# Patient Record
Sex: FEMALE | Race: WHITE | NOT HISPANIC OR LATINO | Employment: OTHER | URBAN - METROPOLITAN AREA
[De-identification: names, ages, dates, MRNs, and addresses within clinical notes are randomized per-mention and may not be internally consistent; named-entity substitution may affect disease eponyms.]

---

## 2017-01-19 ENCOUNTER — COMMUNICATION - HEALTHEAST (OUTPATIENT)
Dept: FAMILY MEDICINE | Facility: CLINIC | Age: 72
End: 2017-01-19

## 2017-01-19 DIAGNOSIS — R60.9 EDEMA: ICD-10-CM

## 2019-04-29 ENCOUNTER — OFFICE VISIT - HEALTHEAST (OUTPATIENT)
Dept: FAMILY MEDICINE | Facility: CLINIC | Age: 74
End: 2019-04-29

## 2019-04-29 DIAGNOSIS — Z74.8 ASSISTANCE NEEDED WITH TRANSPORTATION: ICD-10-CM

## 2019-04-29 DIAGNOSIS — Z12.11 COLON CANCER SCREENING: ICD-10-CM

## 2019-04-29 DIAGNOSIS — R60.0 BILATERAL LOWER EXTREMITY EDEMA: ICD-10-CM

## 2019-04-29 DIAGNOSIS — Z13.820 SCREENING FOR OSTEOPOROSIS: ICD-10-CM

## 2019-04-29 DIAGNOSIS — Z00.00 ROUTINE GENERAL MEDICAL EXAMINATION AT A HEALTH CARE FACILITY: ICD-10-CM

## 2019-04-29 DIAGNOSIS — E66.3 OVERWEIGHT (BMI 25.0-29.9): ICD-10-CM

## 2019-04-29 DIAGNOSIS — Z72.0 TOBACCO USE: ICD-10-CM

## 2019-04-29 DIAGNOSIS — L30.9 DERMATITIS: ICD-10-CM

## 2019-04-29 DIAGNOSIS — D48.5 NEOPLASM OF UNCERTAIN BEHAVIOR OF SKIN: ICD-10-CM

## 2019-04-29 DIAGNOSIS — R79.89 OTHER SPECIFIED ABNORMAL FINDINGS OF BLOOD CHEMISTRY: ICD-10-CM

## 2019-04-29 DIAGNOSIS — N95.9 MENOPAUSAL AND PERIMENOPAUSAL DISORDER: ICD-10-CM

## 2019-04-29 DIAGNOSIS — Z12.31 VISIT FOR SCREENING MAMMOGRAM: ICD-10-CM

## 2019-04-29 RX ORDER — TRIAMCINOLONE ACETONIDE 1 MG/G
CREAM TOPICAL
Qty: 45 G | Refills: 1 | Status: SHIPPED | OUTPATIENT
Start: 2019-04-29 | End: 2022-03-07

## 2019-04-29 ASSESSMENT — MIFFLIN-ST. JEOR: SCORE: 1053.98

## 2019-04-30 ENCOUNTER — COMMUNICATION - HEALTHEAST (OUTPATIENT)
Dept: NURSING | Facility: CLINIC | Age: 74
End: 2019-04-30

## 2019-10-17 ENCOUNTER — AMBULATORY - HEALTHEAST (OUTPATIENT)
Dept: NURSING | Facility: CLINIC | Age: 74
End: 2019-10-17

## 2019-10-17 DIAGNOSIS — Z23 FLU VACCINE NEED: ICD-10-CM

## 2019-12-02 ENCOUNTER — COMMUNICATION - HEALTHEAST (OUTPATIENT)
Dept: FAMILY MEDICINE | Facility: CLINIC | Age: 74
End: 2019-12-02

## 2019-12-02 DIAGNOSIS — E55.9 VITAMIN D DEFICIENCY, UNSPECIFIED: ICD-10-CM

## 2019-12-02 DIAGNOSIS — R41.3 MEMORY DIFFICULTY: ICD-10-CM

## 2020-03-26 ENCOUNTER — COMMUNICATION - HEALTHEAST (OUTPATIENT)
Dept: FAMILY MEDICINE | Facility: CLINIC | Age: 75
End: 2020-03-26

## 2020-03-27 ENCOUNTER — COMMUNICATION - HEALTHEAST (OUTPATIENT)
Dept: FAMILY MEDICINE | Facility: CLINIC | Age: 75
End: 2020-03-27

## 2021-03-03 ENCOUNTER — AMBULATORY - HEALTHEAST (OUTPATIENT)
Dept: NURSING | Facility: CLINIC | Age: 76
End: 2021-03-03

## 2021-03-24 ENCOUNTER — AMBULATORY - HEALTHEAST (OUTPATIENT)
Dept: NURSING | Facility: CLINIC | Age: 76
End: 2021-03-24

## 2021-05-26 ENCOUNTER — OFFICE VISIT - HEALTHEAST (OUTPATIENT)
Dept: FAMILY MEDICINE | Facility: CLINIC | Age: 76
End: 2021-05-26

## 2021-05-26 DIAGNOSIS — Z00.00 WELLNESS EXAMINATION: ICD-10-CM

## 2021-05-26 DIAGNOSIS — Z12.31 VISIT FOR SCREENING MAMMOGRAM: ICD-10-CM

## 2021-05-26 LAB
ANION GAP SERPL CALCULATED.3IONS-SCNC: 10 MMOL/L (ref 5–18)
BUN SERPL-MCNC: 15 MG/DL (ref 8–28)
CALCIUM SERPL-MCNC: 10.1 MG/DL (ref 8.5–10.5)
CHLORIDE BLD-SCNC: 104 MMOL/L (ref 98–107)
CO2 SERPL-SCNC: 25 MMOL/L (ref 22–31)
CREAT SERPL-MCNC: 0.96 MG/DL (ref 0.6–1.1)
ERYTHROCYTE [DISTWIDTH] IN BLOOD BY AUTOMATED COUNT: 12.5 % (ref 11–14.5)
GFR SERPL CREATININE-BSD FRML MDRD: 57 ML/MIN/1.73M2
GLUCOSE BLD-MCNC: 123 MG/DL (ref 70–125)
HCT VFR BLD AUTO: 45 % (ref 35–47)
HGB BLD-MCNC: 15.5 G/DL (ref 12–16)
MCH RBC QN AUTO: 33 PG (ref 27–34)
MCHC RBC AUTO-ENTMCNC: 34.4 G/DL (ref 32–36)
MCV RBC AUTO: 96 FL (ref 80–100)
PLATELET # BLD AUTO: 300 THOU/UL (ref 140–440)
PMV BLD AUTO: 10.6 FL (ref 7–10)
POTASSIUM BLD-SCNC: 4.1 MMOL/L (ref 3.5–5)
RBC # BLD AUTO: 4.69 MILL/UL (ref 3.8–5.4)
SODIUM SERPL-SCNC: 139 MMOL/L (ref 136–145)
WBC: 7.9 THOU/UL (ref 4–11)

## 2021-05-26 ASSESSMENT — MIFFLIN-ST. JEOR: SCORE: 1074.68

## 2021-05-28 NOTE — PROGRESS NOTES
The Clinic Care Guide called the patient  today at the request of the PCP to discuss possible clinic care coordination enrollment. Clinic care coordination was described to the patient and immediate needs were discussed. The patient declined enrollment in clinic care coordination at this time. The patient was provided with contact information for the clinic care guide if their needs changed.    Priority: Routine Class: Internal Referral    Standing Status: Normal Status: Sent [2]    Ordering User: Annelise Cornejo MD Department: [facility]/ob    Auth Provider: ANNELISE CORNEJO Enc Provider: Annelise Cornejo MD    Diagnosis: Assistance needed with transportation      Indications of Use:         Expected Date:        Order Questions:          Order Comments: Transportation difficulties      Sched Instructions:   Patient Instructions:       Visit Types: CCC RN ASSESSMENT      Sched Instruct (Non-Radiology):         Referral Priority: Routine [1]      Additional Comments:        Order Summary Internal Referral, Routine, Primary Care, Continuity of Care      Transfer History

## 2021-05-28 NOTE — PROGRESS NOTES
Assessment and Plan:       Nereyda was seen today for annual wellness visit.    Routine general medical examination at a health care facility  -     Lipid [place] FASTING; Future  -     Mumps Antibody, IgG; Future  -     Rubeola Antibody, IgG; Future  -     Rubella Antibody, IgG; Future  -     Hemoglobin; Future  -Future lab orders placed for fasting lipids and glucose.  She does express concern about recent news reports regarding cases of measles.  We discussed doing titers for measles mumps and rubella to see if a booster is needed.  These will be added to future labs.  Recommend mammogram, colonoscopy and bone density scan.  Orders are placed.  Prevnar 13 is administered today.  She is counseled on vaccine and side effects.  Advised smoking cessation.  Discussed new shingles vaccine.  Advised her to check with insurance.  Encouraged increased physical activity.  Encouraged healthy diet and adequate calcium and vitamin D intake.  Recommend follow-up in 1 year.  Discussed advanced directives. a copy of living will was provided    Dermatitis  -     triamcinolone (KENALOG) 0.1 % cream; Apply sparingly to affected skin twice daily for up to 14 days    Neoplasm of uncertain behavior of skin  -     Ambulatory referral to Dermatology    Bilateral lower extremity edema  -     US Venous Insufficiency Legs Bilateral; Future  -Recommend use of compression stockings and elevation of legs as much as possible    Visit for screening mammogram  -     Mammo Screening Bilateral; Future    Colon cancer screening  -     Ambulatory referral for Colonoscopy    Screening for osteoporosis  -     DXA Bone Density Scan; Future    Menopausal and perimenopausal disorder   -     DXA Bone Density Scan; Future    Overweight (BMI 25.0-29.9)  -     Comprehensive Metabolic Panel; Future  -     Glycosylated Hemoglobin A1c; Future    Assistance needed with transportation  -     Ambulatory referral to Care Management (Primary Care)    Other  specified abnormal findings of blood chemistry   -     Glycosylated Hemoglobin A1c; Future    Tobacco use  Advised cessation.  She declines assistance at this time          The patient's current medical problems were reviewed.    I have had an Advance Directives discussion with the patient.  The following high BMI interventions were performed this visit: encouragement to exercise  I have counseled the patient for tobacco cessation and the follow up will occur  at the next visit.  The following health maintenance schedule was reviewed with the patient and provided in printed form in the after visit summary:   Health Maintenance   Topic Date Due     COLONOSCOPY  01/27/1997     ZOSTER VACCINES (1 of 2) 01/27/1997     DXA SCAN  01/27/2012     MAMMOGRAM  05/03/2018     INFLUENZA VACCINE RULE BASED (1) 08/01/2018     FALL RISK ASSESSMENT  04/29/2020     TD 18+ HE  02/13/2022     ADVANCE DIRECTIVES DISCUSSED WITH PATIENT  04/29/2024     PNEUMOCOCCAL POLYSACCHARIDE VACCINE AGE 65 AND OVER  Completed     PNEUMOCOCCAL CONJUGATE VACCINE FOR ADULTS (PCV13 OR PREVNAR)  Completed        Subjective:   Chief Complaint: Nereyda Vincent is an 74 y.o. female here for an Annual Wellness visit.   HPI: Who comes in today accompanied by her daughter for annual wellness visit.  She typically does not come to the doctor on a regular basis.  She currently lives with her children.  States that she will travel around and spend a few weeks with one child in the move on to the next child for a few weeks and so on.  She is overall healthy individual.  She is a smoker.  No chronic medical problems.  No previous surgeries.  No regular medications.  Family history notable for mother with history of breast and colon cancer.  Patient reports she did not like to come to a lot of appointments because she does not have transportation.  She is overdue for a number of preventative cares including colonoscopy and mammogram.  She is not fasting today for  labs.  She is due for Prevnar 13.  She does agree to that today.  She has had Pneumovax.  On review of systems she reports concern about a scaly patch of skin on her left knee.  This is been present for several years but does not seem to have changed but does not itch or bother her.  She is also noticed some discolorations on her right leg.  These of also been present for a long time and have not changed.  They do not cause irritation.  She does get some swelling in her legs.  It is better first thing in the morning and when she puts her legs up.  She does not like wearing compression stockings as they are uncomfortable.  She has a small lump on her right lower leg that has been evaluated by CT scan and ultrasound in the past.  It was not a blood clot.  Results were reviewed.  It shows a fibrous tissue.  It has not changed.  She does get some discomfort in her legs at times.  She denies concerns with headaches, dizziness or lightheadedness.  No chest pain or pressure.  No dyspnea or chronic cough.  No breast concerns.  No GI concerns.  Pelvic pain, vaginal discharge or bleeding.  No urinary concerns.  Review of systems assessed and otherwise negative.  No other questions today    Review of Systems:  Please see above.  The rest of the review of systems are negative for all systems.    Patient Care Team:  Gissel Hollis DO as PCP - General (Family Medicine)     Patient Active Problem List   Diagnosis     Basal Cell Carcinoma Of The Skin     Dermatitis     Edema     Skin mass     Past Medical History:   Diagnosis Date     Elevated blood pressure 4/26/2016      History reviewed. No pertinent surgical history.   Family History   Problem Relation Age of Onset     Breast cancer Mother 60     Colon cancer Mother      Dementia Mother      Diabetes Father       Social History     Socioeconomic History     Marital status:      Spouse name: Not on file     Number of children: Not on file     Years of education: Not  "on file     Highest education level: Not on file   Occupational History     Not on file   Social Needs     Financial resource strain: Not on file     Food insecurity:     Worry: Not on file     Inability: Not on file     Transportation needs:     Medical: Not on file     Non-medical: Not on file   Tobacco Use     Smoking status: Current Every Day Smoker     Packs/day: 0.50     Smokeless tobacco: Never Used   Substance and Sexual Activity     Alcohol use: No     Drug use: Never     Sexual activity: Never   Lifestyle     Physical activity:     Days per week: Not on file     Minutes per session: Not on file     Stress: Not on file   Relationships     Social connections:     Talks on phone: Not on file     Gets together: Not on file     Attends Yarsanism service: Not on file     Active member of club or organization: Not on file     Attends meetings of clubs or organizations: Not on file     Relationship status: Not on file     Intimate partner violence:     Fear of current or ex partner: Not on file     Emotionally abused: Not on file     Physically abused: Not on file     Forced sexual activity: Not on file   Other Topics Concern     Not on file   Social History Narrative     Not on file      Current Outpatient Medications   Medication Sig Dispense Refill     triamcinolone (KENALOG) 0.1 % cream Apply sparingly to affected skin twice daily for up to 14 days 45 g 1     No current facility-administered medications for this visit.       Objective:   Vital Signs:   Visit Vitals  /80 (Patient Site: Right Arm, Patient Position: Sitting, Cuff Size: Adult Regular)   Pulse 61   Temp 97.7  F (36.5  C) (Oral)   Ht 4' 11.25\" (1.505 m)   Wt 142 lb 1 oz (64.4 kg)   SpO2 97%   BMI 28.45 kg/m         VisionScreening:  No exam data present     PHYSICAL EXAM  Physical Examination: General appearance - alert, well appearing, and in no distress  Mental status - alert, oriented to person, place, and time  Eyes - pupils equal and " reactive, extraocular eye movements intact  Ears - bilateral TM's and external ear canals normal  Nose - normal and patent, no erythema, discharge or polyps  Mouth - mucous membranes moist, pharynx normal without lesions  Neck - supple, no significant adenopathy  Lymphatics - no palpable lymphadenopathy, no hepatosplenomegaly  Chest - clear to auscultation, no wheezes, rales or rhonchi, symmetric air entry  Heart - normal rate, regular rhythm, normal S1, S2, no murmurs, rubs, clicks or gallops  Abdomen - soft, nontender, nondistended, no masses or organomegaly  Breasts - breasts appear normal, no suspicious masses, no skin or nipple changes or axillary nodes  Neurological - alert, oriented, normal speech, no focal findings or movement disorder noted  Extremities - peripheral pulses normal, no pedal edema, no clubbing or cyanosis  Skin - patch of dry scaly skin present anterior left knee, patches of hyperpigmentation present on left medial thigh      Assessment Results 4/29/2019   Activities of Daily Living No help needed   Instrumental Activities of Daily Living No help needed   Mini Cog Total Score 5   Some recent data might be hidden     A Mini-Cog score of 0-2 suggests the possibility of dementia, score of 3-5 suggests no dementia    Identified Health Risks:     She is at risk for lack of exercise and has been provided with information to increase physical activity for the benefit of her well-being.  The patient reports that she does not have all recommended working emergency equipment available. She was provided with information about emergency preparedness, including smoke detectors.  Information regarding advance directives (living wells), including where she can download the appropriate form, was provided to the patient via the AVS.

## 2021-06-01 ENCOUNTER — COMMUNICATION - HEALTHEAST (OUTPATIENT)
Dept: FAMILY MEDICINE | Facility: CLINIC | Age: 76
End: 2021-06-01

## 2021-06-03 VITALS — HEIGHT: 59 IN | WEIGHT: 142.06 LBS | BODY MASS INDEX: 28.64 KG/M2

## 2021-06-07 NOTE — TELEPHONE ENCOUNTER
Talked to patient about having to reschedule her pre-op somewhere else as we are no longer seeing patients face-to-face and we cannot do a pre-op via phone visit.   Patient stated that she is going to talk to her daughter and probably cancel her operation until later.    Jennifer Chamberlain, CMA

## 2021-06-17 NOTE — PATIENT INSTRUCTIONS - HE
Patient Instructions by Annelise Canales MD at 4/29/2019  4:00 PM     Author: Annelise Canales MD Service: -- Author Type: Physician    Filed: 4/29/2019  4:13 PM Encounter Date: 4/29/2019 Status: Signed    : Annelise Canales MD (Physician)         Patient Education     Exercise for a Healthier Heart  You may wonder how you can improve the health of your heart. If youre thinking about exercise, youre on the right track. You dont need to become an athlete, but you do need a certain amount of brisk exercise to help strengthen your heart. If you have been diagnosed with a heart condition, your doctor may recommend exercise to help stabilize your condition. To help make exercise a habit, choose safe, fun activities.       Be sure to check with your health care provider before starting an exercise program.    Why exercise?  Exercising regularly offers many healthy rewards. It can help you do all of the following:    Improve your blood cholesterol levels to help prevent further heart trouble    Lower your blood pressure to help prevent a stroke or heart attack    Control diabetes, or reduce your risk of getting this disease    Improve your heart and lung function    Reach and maintain a healthy weight    Make your muscles stronger and more limber so you can stay active    Prevent falls and fractures by slowing the loss of bone mass (osteoporosis)    Manage stress better  Exercise tips  Ease into your routine. Set small goals. Then build on them.  Exercise on most days. Aim for a total of 150 or more minutes of moderate to  vigorous intensity activity each week. Consider 40 minutes, 3 to 4 times a week. For best results, activity should last for 40 minutes on average. It is OK to work up to the 40 minute period over time. Examples of moderate-intensity activity is walking one mile in 15 minutes or 30 to 45 minutes of yard work.  Step up your daily activity level. Along with your exercise program, try being more active  throughout the day. Walk instead of drive. Do more household tasks or yard work.  Choose one or more activities you enjoy. Walking is one of the easiest things you can do. You can also try swimming, riding a bike, or taking an exercise class.  Stop exercising and call your doctor if you:    Have chest pain or feel dizzy or lightheaded    Feel burning, tightness, pressure, or heaviness in your chest, neck, shoulders, back, or arms    Have unusual shortness of breath    Have increased joint or muscle pain    Have palpitations or an irregular heartbeat      8374-0094 [employer]. [address] [place] [address]. All rights reserved. This information is not intended as a substitute for professional medical care. Always follow your healthcare professional's instructions.         Patient Education    Home Fire Safety  Each year, thousands of people, including children, are injured and killed in home fires. Children are often curious about fire, and may not understand the dangers. This makes home fire safety practices especially important. Three important things you can do to keep your home safe from fire are:    Install smoke alarms in your home and make sure they work properly.    Teach children not to play with matches, lighters, and other materials that can be used to start fires. And keep these materials out of childrens reach.    Teach children what to do in case of fire. Create a fire safety action plan and practice it.  Read on for more details about keeping your family and home safe from fire.        Being Prepared for a Fire  A home fire can happen at any time. The following can help you be prepared:    Install smoke alarms on every level of your home, including the basement and outside all sleeping areas. This simple step cuts your familys risk of dying in a fire nearly in half.    Test smoke alarms monthly, and change the batteries once a year or when the alarm chirps.    Dont disable  smoke alarms, even for a short time.    Ask your local fire department for tips on where to place smoke alarms in your home.    Replace all smoke alarms every 10 years.    Consider using voice smoke alarms. These alarms allow you to substitute your own voice for the alarm sound. They are helpful because many children dont wake up to the sound of a regular smoke alarm.    Install carbon monoxide detectors near sleeping areas.    Be aware that carbon monoxide is a byproduct of smoke that can be deadly. Its a gas that you cant see, smell, or taste.    Consider buying a combination smoke alarm / carbon monoxide detector.    Keep fire extinguishers in the home.    Keep them in accessible locations, especially in the kitchen.    Check usage dates to make sure they are not .    Use fire extinguishers only when the fire is in a contained area and is not spreading. (Otherwise, you should focus on getting out of the home.)    Train adults to use fire extinguishers. (Children should focus on getting out of the home during a fire.)    If you live in an apartment, talk to your landlord about where smoke alarms are and how often they are tested. Also ask about fire extinguisher locations and emergency exit routes.  Indoor Fire Safety  Many things in your home are potential fire hazards. Follow these steps to help keep your home safe.    Be careful in the kitchen.    Never leave food thats cooking unattended.    If a fire breaks out in a cooking pan, put a lid on it to smother it. And never throw water on a grease fire. It will make the fire worse.    Conduct a home safety inspection. Look for anything, such as frayed wires and cords, that can cause a fire. Fix or remove any fire safety hazards you find.    Keep all matches and lighters in a secured drawer or cabinet out of the reach of children. Use childproof lighters.    Check to make sure all appliances, including the stove, are turned off before leaving the  home.    Know where the gas main shut-off is located.    Make sure space heaters are stable and have protective covers. Keep them at least 3 feet from anything that can burn, such as curtains. Dont use space heaters in areas where young kids spend time alone.    Keep flammable liquids such as kerosene and gasoline locked up and safely stored away from kids and heat.    Keep all smoking materials out of reach of children. And never smoke in bed. If possible, smoke outdoors only.  Outdoor Fire Safety  Fire can be a hazard outdoors as well as indoors. When outdoors, be sure to do the following:    Always supervise kids near a barbecue grill, campfire, or portable stove.    Dont use fire pits around children. Kids can fall into them, and pits can be hot even after the fire goes out.    Keep a garden hose or fire extinguisher handy when cooking outdoors in case of fire.  Teaching Your Child About Fire Safety  One of the best ways to keep your home safe from fire is education. Make sure everyone in your family knows fire safety rules, including children.    Teach your children the dangers of matches, lighters, and other dangerous items.    Teach them to never touch these and other objects that are hot, such as candles.    Have them tell you right away whenever they find matches or lighters. Explain that these items are tools for grown-ups, not toys. And never amuse children with matches or lighters.    Round up all matches and lighters and store them safely. In case you missed some, ask your children to tell you where any are located throughout your home.    Never leave a child alone in a room with a lit candle. Dont allow teens to have candles in their rooms.    Show children what to do in case of fire.    Be sure your kids know what the fire alarm sounds like and what to do if it goes off.    Teach kids what to do if their clothes catch fire: Stop, Drop to the ground, and Roll until the fire is put out. They should also  cover their face with their hands. Practice these steps with your children. Make sure they understand that running will make the fire burn faster.    Show children how to crawl below smoke during a fire.    Make sure kids know at least two escape routes from each room in the home. These escape routes can be windows.    Teach kids to test doors for nearby fire by feeling for heat with the back of their hand. If the door is warm or hot, they should try their second exit.    Explain to children that they cant hide from a fire. Hiding in a closet or under a bed wont make them safe. Instead, they should try to escape the home. And if they cant escape, they should let others know they are trapped. They can do this by shutting the door to the room, opening a window, and turning on the lights.    Talk to your local fire department.    Introduce your children to a . Let them know that firefighters will look different when in full protective gear. Tell them to never hide from firefighters, and to follow all directions from firefighters during a fire.    Find out if the fire department has a fire safety program for kids.      Create a Fire Safety Plan  Create a plan for your family to follow in case of a fire. Try making it a family project. Important steps for the plan include leaving the home right away and having a designated meeting place.    Make sure your child understands to get out and stay out. He or she should get out of the home immediately and not go back in, even if family members or pets are still inside.    Decide on a safe meeting place away from the home for everyone to gather.    Teach children to call 911 or emergency services from a cell phone or neighbors phone. Make sure they know to do this only after they are safely out of the home.    Teach your children the fire safety plan. Practice it and make sure they understand it.    Have fire drills twice a year to keep your children prepared in case  of fire.    Visit the National Fire Protection Association web site at www.nfpa.org for more information.      0255-1451 [employer]. [address] [place] [address], [place] [address]. All rights reserved. This information is not intended as a substitute for professional medical care. Always follow your healthcare professional's instructions.         Patient Education   Understanding Advance Care Planning  Advance care planning is the process of deciding ones own future medical care. It helps ensure that if you cant speak for yourself, your wishes can still be carried out. The plan is a series of legal documents that note a persons wishes. The documents vary by state. Advance care planning may be done when a person has a serious illness that is expected to get worse. It may be done before major surgery. And it can help you and your family be prepared in case of a major illness or injury. Advance care planning helps with making decisions at these times.       A health care proxy is a person who acts as the voice of a patient when the patient cant speak for himself or herself. The name of this role varies by state. It may be called a Durable Medical Power of  or Durable Power of  for Healthcare. It may be called an agent, surrogate, or advocate. Or it may be called a representative or decision maker. It is an official duty that is identified by a legal document. The document also varies by state.    Why Is Advance Care Planning Important?  If a person communicates their healthcare wishes:    They will be given medical care that matches their values and goals.    Their family members will not be forced to make decisions in a crisis with no guidance.  Creating a Plan  Making an advance care plan is often done in 3 steps:    Thinking about ones wishes. To create an advance care plan, you should think about what kind of medical treatment you would want if you lose the ability to communicate. Are  there any situations in which you would refuse or stop treatment? Are there therapies you would want or not want? And whom do you want to make decisions for you? There are many places to learn more about how to plan for your care. Ask your doctor or  for resources.    Picking a health care proxy. This means choosing a trusted person to speak for you only when you cant speak for yourself. When you cannot make medical decisions, your proxy makes sure the instructions in your advance care plan are followed. A proxy does not make decisions based on his or her own opinions. They must put aside those opinions and values if needed, and carry out your wishes.    Filling out the legal documents. There are several kinds of legal documents for advance care planning. Each one tells health care providers your wishes. The documents may vary by state. They must be signed and may need to be witnessed or notarized. You can cancel or change them whenever you wish. Depending on your state, the documents may include a Healthcare Proxy form, Living Will, Durable Medical Power of , Advance Directive, or others.  The Familys Role  The best help a family can give is to support their loved ones wishes. Open and honest communication is vital. Family should express any concerns they have about the patients choices while the patient can still make decisions.    8423-3432 [employer]. [address] [place] [address], [place] [address]. All rights reserved. This information is not intended as a substitute for professional medical care. Always follow your healthcare professional's instructions.         Also, [employer] [place] offers a free, downloadable health care directive that allows you to share your treatment choices and personal preferences if you cannot communicate your wishes. It also allows you to appoint another person (called a health care agent) to make health care decisions if you are unable to do  so. You can download an advance directive by going here: [email or web address]     Patient Education   Personalized Prevention Plan  You are due for the preventive services outlined below.  Your care team is available to assist you in scheduling these services.  If you have already completed any of these items, please share that information with your care team to update in your medical record.  Health Maintenance   Topic Date Due   ? ADVANCE DIRECTIVES DISCUSSED WITH PATIENT  01/27/1963   ? COLONOSCOPY  01/27/1997   ? ZOSTER VACCINES (1 of 2) 01/27/1997   ? DXA SCAN  01/27/2012   ? PNEUMOCOCCAL CONJUGATE VACCINE FOR ADULTS (PCV13 OR PREVNAR)  01/27/2012   ? FALL RISK ASSESSMENT  01/27/2012   ? MAMMOGRAM  05/03/2018   ? INFLUENZA VACCINE RULE BASED (1) 08/01/2018   ? TD 18+ HE  02/13/2022   ? PNEUMOCOCCAL POLYSACCHARIDE VACCINE AGE 65 AND OVER  Completed

## 2021-06-25 NOTE — PROGRESS NOTES
S:  Very pleasant 76-year-old female who presents for annual wellness exam.  I had a conversation with her daughter who noted that the patient has memory issues as well as sleeping issues.  In addition there is a skin lesion on her forehead which the daughter is concerned about.  Patient has a past history of skin cancer that has been removed in the past.  She also previously lived in [place].  ROS: Negative for bowel or bladder issues.  No reported chest pain or difficulty breathing.    SH: Patient continues to smoke.  She lives with her daughter.  FH: Positive for breast cancer in her mother.    Medications: Kenalog as needed    O:   Blood pressure 130/88, pulse 67 respiration 16, temperature 98.0  Alert conversant and confused  Lungs-no adventitious sounds and diminished consistent with COPD  Heart-regular rate and rhythm  Skin-Pink and dry except for a irregularly shaped 6 mm lesion on the midline of the forehead    A:   Skin lesion  Dementia  Smoking    P:   Patient declines colonoscopy.  Patient declined mammogram but I ordered it nonetheless and asked her to reconsider given her strong family history  CBC BMP today  Patient has dementia and will discuss memory issues with her daughter.  The patient was unable to recall any of the 3 items on the dementia quiz.  Patient continues to drive and it would be wise for her to consider not driving.  This will be relayed to daughter.    RTC 1 year

## 2021-06-26 ENCOUNTER — HEALTH MAINTENANCE LETTER (OUTPATIENT)
Age: 76
End: 2021-06-26

## 2021-07-04 NOTE — LETTER
Letter by Amparo Bishop MD at      Author: Amparo Bishop MD Service: -- Author Type: --    Filed:  Encounter Date: 6/1/2021 Status: (Other)         Nereyda Vincent  [address] [place] [id number]            June 2, 2021        Dear Ms. Vincent,        Below are the results from your recent visit:    Resulted Orders   HM2(CBC w/o Differential)   Result Value Ref Range    WBC 7.9 4.0 - 11.0 thou/uL    RBC 4.69 3.80 - 5.40 mill/uL    Hemoglobin 15.5 12.0 - 16.0 g/dL    Hematocrit 45.0 35.0 - 47.0 %    MCV 96 80 - 100 fL    MCH 33.0 27.0 - 34.0 pg    MCHC 34.4 32.0 - 36.0 g/dL    RDW 12.5 11.0 - 14.5 %    Platelets 300 140 - 440 thou/uL    MPV 10.6 (H) 7.0 - 10.0 fL   Basic Metabolic Panel   Result Value Ref Range    Sodium 139 136 - 145 mmol/L    Potassium 4.1 3.5 - 5.0 mmol/L    Chloride 104 98 - 107 mmol/L    CO2 25 22 - 31 mmol/L    Anion Gap, Calculation 10 5 - 18 mmol/L    Glucose 123 70 - 125 mg/dL    Calcium 10.1 8.5 - 10.5 mg/dL    BUN 15 8 - 28 mg/dL    Creatinine 0.96 0.60 - 1.10 mg/dL    GFR MDRD Af Amer >60 >60 mL/min/1.73m2    GFR MDRD Non Af Amer 57 (L) >60 mL/min/1.73m2    Narrative    Fasting Glucose reference range is 70-99 mg/dL per  American Diabetes Association (ADA) guidelines.         Nereyda, your laboratory results are normal.  That is good news.  Thank you for coming in to visit.  We should visit again in 1 year.      Sincerely,        JANN Bishop MD, RHONA  [facility]  [phone]

## 2021-07-04 NOTE — LETTER
Letter by Amparo Bishop MD at      Author: Amparo Bishop MD Service: -- Author Type: --    Filed:  Encounter Date: 6/1/2021 Status: (Other)         Nereyda Vincent  [address] [place] [id number]            June 1, 2021        Dear Ms. Vincent,        Below are the results from your recent visit:    Resulted Orders   HM2(CBC w/o Differential)   Result Value Ref Range    WBC 7.9 4.0 - 11.0 thou/uL    RBC 4.69 3.80 - 5.40 mill/uL    Hemoglobin 15.5 12.0 - 16.0 g/dL    Hematocrit 45.0 35.0 - 47.0 %    MCV 96 80 - 100 fL    MCH 33.0 27.0 - 34.0 pg    MCHC 34.4 32.0 - 36.0 g/dL    RDW 12.5 11.0 - 14.5 %    Platelets 300 140 - 440 thou/uL    MPV 10.6 (H) 7.0 - 10.0 fL   Basic Metabolic Panel   Result Value Ref Range    Sodium 139 136 - 145 mmol/L    Potassium 4.1 3.5 - 5.0 mmol/L    Chloride 104 98 - 107 mmol/L    CO2 25 22 - 31 mmol/L    Anion Gap, Calculation 10 5 - 18 mmol/L    Glucose 123 70 - 125 mg/dL    Calcium 10.1 8.5 - 10.5 mg/dL    BUN 15 8 - 28 mg/dL    Creatinine 0.96 0.60 - 1.10 mg/dL    GFR MDRD Af Amer >60 >60 mL/min/1.73m2    GFR MDRD Non Af Amer 57 (L) >60 mL/min/1.73m2    Narrative    Fasting Glucose reference range is 70-99 mg/dL per  American Diabetes Association (ADA) guidelines.         Nereyda, your laboratory results are normal.  That is good news.  Thank you for coming in to visit.  We should visit again in 1 year.      Sincerely,        JANN Bishop MD, RHONA  [facility]  [phone]

## 2021-07-06 VITALS
BODY MASS INDEX: 28.27 KG/M2 | DIASTOLIC BLOOD PRESSURE: 88 MMHG | HEIGHT: 60 IN | HEART RATE: 67 BPM | WEIGHT: 144 LBS | OXYGEN SATURATION: 98 % | SYSTOLIC BLOOD PRESSURE: 130 MMHG | TEMPERATURE: 98 F

## 2021-10-16 ENCOUNTER — HEALTH MAINTENANCE LETTER (OUTPATIENT)
Age: 76
End: 2021-10-16

## 2022-01-22 ENCOUNTER — MYC MEDICAL ADVICE (OUTPATIENT)
Dept: FAMILY MEDICINE | Facility: CLINIC | Age: 77
End: 2022-01-22
Payer: COMMERCIAL

## 2022-03-07 ENCOUNTER — APPOINTMENT (OUTPATIENT)
Dept: RADIOLOGY | Facility: CLINIC | Age: 77
DRG: 481 | End: 2022-03-07
Attending: EMERGENCY MEDICINE
Payer: COMMERCIAL

## 2022-03-07 ENCOUNTER — HOSPITAL ENCOUNTER (INPATIENT)
Facility: CLINIC | Age: 77
LOS: 2 days | Discharge: NURSING, REHAB OR LONG-TERM CARE | DRG: 481 | End: 2022-03-09
Attending: EMERGENCY MEDICINE | Admitting: INTERNAL MEDICINE
Payer: COMMERCIAL

## 2022-03-07 ENCOUNTER — ANESTHESIA (OUTPATIENT)
Dept: SURGERY | Facility: CLINIC | Age: 77
DRG: 481 | End: 2022-03-07
Payer: COMMERCIAL

## 2022-03-07 ENCOUNTER — APPOINTMENT (OUTPATIENT)
Dept: RADIOLOGY | Facility: CLINIC | Age: 77
DRG: 481 | End: 2022-03-07
Attending: ORTHOPAEDIC SURGERY
Payer: COMMERCIAL

## 2022-03-07 ENCOUNTER — ANESTHESIA EVENT (OUTPATIENT)
Dept: SURGERY | Facility: CLINIC | Age: 77
DRG: 481 | End: 2022-03-07
Payer: COMMERCIAL

## 2022-03-07 DIAGNOSIS — I10 BENIGN ESSENTIAL HYPERTENSION: ICD-10-CM

## 2022-03-07 DIAGNOSIS — S72.002A HIP FRACTURE, LEFT, CLOSED, INITIAL ENCOUNTER (H): ICD-10-CM

## 2022-03-07 DIAGNOSIS — S72.002A CLOSED FRACTURE OF LEFT HIP, INITIAL ENCOUNTER (H): Primary | ICD-10-CM

## 2022-03-07 LAB
ANION GAP SERPL CALCULATED.3IONS-SCNC: 15 MMOL/L (ref 5–18)
BASOPHILS # BLD AUTO: 0.1 10E3/UL (ref 0–0.2)
BASOPHILS NFR BLD AUTO: 1 %
BUN SERPL-MCNC: 12 MG/DL (ref 8–28)
CALCIUM SERPL-MCNC: 10.4 MG/DL (ref 8.5–10.5)
CHLORIDE BLD-SCNC: 105 MMOL/L (ref 98–107)
CO2 SERPL-SCNC: 19 MMOL/L (ref 22–31)
CREAT SERPL-MCNC: 1.01 MG/DL (ref 0.6–1.1)
EOSINOPHIL # BLD AUTO: 0.1 10E3/UL (ref 0–0.7)
EOSINOPHIL NFR BLD AUTO: 1 %
ERYTHROCYTE [DISTWIDTH] IN BLOOD BY AUTOMATED COUNT: 13 % (ref 10–15)
GFR SERPL CREATININE-BSD FRML MDRD: 58 ML/MIN/1.73M2
GLUCOSE BLD-MCNC: 138 MG/DL (ref 70–125)
HCT VFR BLD AUTO: 44.2 % (ref 35–47)
HGB BLD-MCNC: 15.2 G/DL (ref 11.7–15.7)
HOLD SPECIMEN: NORMAL
IMM GRANULOCYTES # BLD: 0 10E3/UL
IMM GRANULOCYTES NFR BLD: 0 %
LYMPHOCYTES # BLD AUTO: 1.8 10E3/UL (ref 0.8–5.3)
LYMPHOCYTES NFR BLD AUTO: 17 %
MCH RBC QN AUTO: 32.7 PG (ref 26.5–33)
MCHC RBC AUTO-ENTMCNC: 34.4 G/DL (ref 31.5–36.5)
MCV RBC AUTO: 95 FL (ref 78–100)
MONOCYTES # BLD AUTO: 0.6 10E3/UL (ref 0–1.3)
MONOCYTES NFR BLD AUTO: 6 %
NEUTROPHILS # BLD AUTO: 8 10E3/UL (ref 1.6–8.3)
NEUTROPHILS NFR BLD AUTO: 75 %
NRBC # BLD AUTO: 0 10E3/UL
NRBC BLD AUTO-RTO: 0 /100
PLATELET # BLD AUTO: 327 10E3/UL (ref 150–450)
POTASSIUM BLD-SCNC: 3.9 MMOL/L (ref 3.5–5)
RBC # BLD AUTO: 4.65 10E6/UL (ref 3.8–5.2)
SARS-COV-2 RNA RESP QL NAA+PROBE: NEGATIVE
SODIUM SERPL-SCNC: 139 MMOL/L (ref 136–145)
WBC # BLD AUTO: 10.5 10E3/UL (ref 4–11)

## 2022-03-07 PROCEDURE — 258N000003 HC RX IP 258 OP 636: Performed by: ANESTHESIOLOGY

## 2022-03-07 PROCEDURE — 250N000013 HC RX MED GY IP 250 OP 250 PS 637: Performed by: ORTHOPAEDIC SURGERY

## 2022-03-07 PROCEDURE — 85025 COMPLETE CBC W/AUTO DIFF WBC: CPT | Performed by: EMERGENCY MEDICINE

## 2022-03-07 PROCEDURE — 73502 X-RAY EXAM HIP UNI 2-3 VIEWS: CPT

## 2022-03-07 PROCEDURE — 250N000011 HC RX IP 250 OP 636: Performed by: EMERGENCY MEDICINE

## 2022-03-07 PROCEDURE — 93010 ELECTROCARDIOGRAM REPORT: CPT | Performed by: INTERNAL MEDICINE

## 2022-03-07 PROCEDURE — 120N000001 HC R&B MED SURG/OB

## 2022-03-07 PROCEDURE — 0QS736Z REPOSITION LEFT UPPER FEMUR WITH INTRAMEDULLARY INTERNAL FIXATION DEVICE, PERCUTANEOUS APPROACH: ICD-10-PCS | Performed by: ORTHOPAEDIC SURGERY

## 2022-03-07 PROCEDURE — 250N000009 HC RX 250: Performed by: NURSE ANESTHETIST, CERTIFIED REGISTERED

## 2022-03-07 PROCEDURE — 250N000011 HC RX IP 250 OP 636: Performed by: INTERNAL MEDICINE

## 2022-03-07 PROCEDURE — 96374 THER/PROPH/DIAG INJ IV PUSH: CPT

## 2022-03-07 PROCEDURE — 99223 1ST HOSP IP/OBS HIGH 75: CPT | Performed by: INTERNAL MEDICINE

## 2022-03-07 PROCEDURE — 250N000011 HC RX IP 250 OP 636: Performed by: ANESTHESIOLOGY

## 2022-03-07 PROCEDURE — 250N000009 HC RX 250: Performed by: ORTHOPAEDIC SURGERY

## 2022-03-07 PROCEDURE — 999N000182 XR SURGERY CARM FLUORO GREATER THAN 5 MIN: Mod: TC

## 2022-03-07 PROCEDURE — 71045 X-RAY EXAM CHEST 1 VIEW: CPT

## 2022-03-07 PROCEDURE — 93005 ELECTROCARDIOGRAM TRACING: CPT

## 2022-03-07 PROCEDURE — 87635 SARS-COV-2 COVID-19 AMP PRB: CPT | Performed by: EMERGENCY MEDICINE

## 2022-03-07 PROCEDURE — 250N000011 HC RX IP 250 OP 636: Performed by: ORTHOPAEDIC SURGERY

## 2022-03-07 PROCEDURE — 370N000017 HC ANESTHESIA TECHNICAL FEE, PER MIN: Performed by: ORTHOPAEDIC SURGERY

## 2022-03-07 PROCEDURE — 999N000141 HC STATISTIC PRE-PROCEDURE NURSING ASSESSMENT: Performed by: ORTHOPAEDIC SURGERY

## 2022-03-07 PROCEDURE — 258N000003 HC RX IP 258 OP 636: Performed by: NURSE ANESTHETIST, CERTIFIED REGISTERED

## 2022-03-07 PROCEDURE — C9803 HOPD COVID-19 SPEC COLLECT: HCPCS

## 2022-03-07 PROCEDURE — 99285 EMERGENCY DEPT VISIT HI MDM: CPT | Mod: 25

## 2022-03-07 PROCEDURE — 710N000010 HC RECOVERY PHASE 1, LEVEL 2, PER MIN: Performed by: ORTHOPAEDIC SURGERY

## 2022-03-07 PROCEDURE — 93005 ELECTROCARDIOGRAM TRACING: CPT | Performed by: STUDENT IN AN ORGANIZED HEALTH CARE EDUCATION/TRAINING PROGRAM

## 2022-03-07 PROCEDURE — 272N000001 HC OR GENERAL SUPPLY STERILE: Performed by: ORTHOPAEDIC SURGERY

## 2022-03-07 PROCEDURE — 250N000011 HC RX IP 250 OP 636: Performed by: NURSE ANESTHETIST, CERTIFIED REGISTERED

## 2022-03-07 PROCEDURE — 80048 BASIC METABOLIC PNL TOTAL CA: CPT | Performed by: EMERGENCY MEDICINE

## 2022-03-07 PROCEDURE — 999N000157 HC STATISTIC RCP TIME EA 10 MIN

## 2022-03-07 PROCEDURE — C1713 ANCHOR/SCREW BN/BN,TIS/BN: HCPCS | Performed by: ORTHOPAEDIC SURGERY

## 2022-03-07 PROCEDURE — C1769 GUIDE WIRE: HCPCS | Performed by: ORTHOPAEDIC SURGERY

## 2022-03-07 PROCEDURE — 36415 COLL VENOUS BLD VENIPUNCTURE: CPT | Performed by: EMERGENCY MEDICINE

## 2022-03-07 PROCEDURE — 360N000083 HC SURGERY LEVEL 3 W/ FLUORO, PER MIN: Performed by: ORTHOPAEDIC SURGERY

## 2022-03-07 PROCEDURE — 250N000013 HC RX MED GY IP 250 OP 250 PS 637: Performed by: ANESTHESIOLOGY

## 2022-03-07 DEVICE — IMP NAIL SYN CAN FEM PROX TFNA 11X170MM 130D 04.037.142S: Type: IMPLANTABLE DEVICE | Site: HIP | Status: FUNCTIONAL

## 2022-03-07 DEVICE — IMP SCR SYN 5.0 TI LOCK T25 STARDRIVE 34MM 04.005.524: Type: IMPLANTABLE DEVICE | Site: HIP | Status: FUNCTIONAL

## 2022-03-07 DEVICE — IMP SCR SYN TFNA FENESTRATED LAG 90MM 04.038.190S: Type: IMPLANTABLE DEVICE | Site: HIP | Status: FUNCTIONAL

## 2022-03-07 RX ORDER — HYDROMORPHONE HYDROCHLORIDE 1 MG/ML
0.5 INJECTION, SOLUTION INTRAMUSCULAR; INTRAVENOUS; SUBCUTANEOUS ONCE
Status: COMPLETED | OUTPATIENT
Start: 2022-03-07 | End: 2022-03-07

## 2022-03-07 RX ORDER — SODIUM CHLORIDE, SODIUM LACTATE, POTASSIUM CHLORIDE, CALCIUM CHLORIDE 600; 310; 30; 20 MG/100ML; MG/100ML; MG/100ML; MG/100ML
INJECTION, SOLUTION INTRAVENOUS CONTINUOUS
Status: DISCONTINUED | OUTPATIENT
Start: 2022-03-07 | End: 2022-03-07 | Stop reason: HOSPADM

## 2022-03-07 RX ORDER — DEXAMETHASONE SODIUM PHOSPHATE 10 MG/ML
INJECTION, SOLUTION INTRAMUSCULAR; INTRAVENOUS PRN
Status: DISCONTINUED | OUTPATIENT
Start: 2022-03-07 | End: 2022-03-07

## 2022-03-07 RX ORDER — FENTANYL CITRATE-0.9 % NACL/PF 10 MCG/ML
PLASTIC BAG, INJECTION (ML) INTRAVENOUS
Status: DISCONTINUED
Start: 2022-03-07 | End: 2022-03-07 | Stop reason: HOSPADM

## 2022-03-07 RX ORDER — POLYETHYLENE GLYCOL 3350 17 G/17G
17 POWDER, FOR SOLUTION ORAL DAILY
Status: DISCONTINUED | OUTPATIENT
Start: 2022-03-08 | End: 2022-03-09 | Stop reason: HOSPADM

## 2022-03-07 RX ORDER — ACETAMINOPHEN 325 MG/1
650 TABLET ORAL EVERY 6 HOURS PRN
Status: DISCONTINUED | OUTPATIENT
Start: 2022-03-07 | End: 2022-03-09 | Stop reason: HOSPADM

## 2022-03-07 RX ORDER — CEFAZOLIN SODIUM 2 G/100ML
2 INJECTION, SOLUTION INTRAVENOUS
Status: COMPLETED | OUTPATIENT
Start: 2022-03-07 | End: 2022-03-07

## 2022-03-07 RX ORDER — NALOXONE HYDROCHLORIDE 0.4 MG/ML
0.4 INJECTION, SOLUTION INTRAMUSCULAR; INTRAVENOUS; SUBCUTANEOUS
Status: DISCONTINUED | OUTPATIENT
Start: 2022-03-07 | End: 2022-03-09 | Stop reason: HOSPADM

## 2022-03-07 RX ORDER — NALOXONE HYDROCHLORIDE 0.4 MG/ML
0.2 INJECTION, SOLUTION INTRAMUSCULAR; INTRAVENOUS; SUBCUTANEOUS
Status: DISCONTINUED | OUTPATIENT
Start: 2022-03-07 | End: 2022-03-09 | Stop reason: HOSPADM

## 2022-03-07 RX ORDER — ACETAMINOPHEN 325 MG/1
650 TABLET ORAL EVERY 4 HOURS PRN
Status: DISCONTINUED | OUTPATIENT
Start: 2022-03-10 | End: 2022-03-09 | Stop reason: HOSPADM

## 2022-03-07 RX ORDER — MAGNESIUM HYDROXIDE 1200 MG/15ML
LIQUID ORAL PRN
Status: DISCONTINUED | OUTPATIENT
Start: 2022-03-07 | End: 2022-03-07 | Stop reason: HOSPADM

## 2022-03-07 RX ORDER — NICOTINE 21 MG/24HR
1 PATCH, TRANSDERMAL 24 HOURS TRANSDERMAL DAILY
Status: DISCONTINUED | OUTPATIENT
Start: 2022-03-07 | End: 2022-03-09 | Stop reason: HOSPADM

## 2022-03-07 RX ORDER — FENTANYL CITRATE 50 UG/ML
25 INJECTION, SOLUTION INTRAMUSCULAR; INTRAVENOUS EVERY 5 MIN PRN
Status: DISCONTINUED | OUTPATIENT
Start: 2022-03-07 | End: 2022-03-07 | Stop reason: HOSPADM

## 2022-03-07 RX ORDER — HYDROMORPHONE HCL IN WATER/PF 6 MG/30 ML
0.2 PATIENT CONTROLLED ANALGESIA SYRINGE INTRAVENOUS EVERY 6 HOURS PRN
Status: DISCONTINUED | OUTPATIENT
Start: 2022-03-07 | End: 2022-03-08

## 2022-03-07 RX ORDER — LIDOCAINE 40 MG/G
CREAM TOPICAL
Status: DISCONTINUED | OUTPATIENT
Start: 2022-03-07 | End: 2022-03-09 | Stop reason: HOSPADM

## 2022-03-07 RX ORDER — EPHEDRINE SULFATE 50 MG/ML
INJECTION, SOLUTION INTRAMUSCULAR; INTRAVENOUS; SUBCUTANEOUS PRN
Status: DISCONTINUED | OUTPATIENT
Start: 2022-03-07 | End: 2022-03-07

## 2022-03-07 RX ORDER — CEFAZOLIN SODIUM 1 G/3ML
1 INJECTION, POWDER, FOR SOLUTION INTRAMUSCULAR; INTRAVENOUS EVERY 8 HOURS
Status: COMPLETED | OUTPATIENT
Start: 2022-03-08 | End: 2022-03-08

## 2022-03-07 RX ORDER — PROCHLORPERAZINE MALEATE 5 MG
5 TABLET ORAL EVERY 6 HOURS PRN
Status: DISCONTINUED | OUTPATIENT
Start: 2022-03-07 | End: 2022-03-09 | Stop reason: HOSPADM

## 2022-03-07 RX ORDER — PROPOFOL 10 MG/ML
INJECTION, EMULSION INTRAVENOUS CONTINUOUS PRN
Status: DISCONTINUED | OUTPATIENT
Start: 2022-03-07 | End: 2022-03-07

## 2022-03-07 RX ORDER — ONDANSETRON 2 MG/ML
4 INJECTION INTRAMUSCULAR; INTRAVENOUS EVERY 6 HOURS PRN
Status: DISCONTINUED | OUTPATIENT
Start: 2022-03-07 | End: 2022-03-09 | Stop reason: HOSPADM

## 2022-03-07 RX ORDER — LIDOCAINE 40 MG/G
CREAM TOPICAL
Status: DISCONTINUED | OUTPATIENT
Start: 2022-03-07 | End: 2022-03-07 | Stop reason: HOSPADM

## 2022-03-07 RX ORDER — OXYCODONE HYDROCHLORIDE 5 MG/1
5 TABLET ORAL EVERY 4 HOURS PRN
Status: DISCONTINUED | OUTPATIENT
Start: 2022-03-07 | End: 2022-03-07

## 2022-03-07 RX ORDER — MAGNESIUM SULFATE 4 G/50ML
4 INJECTION INTRAVENOUS ONCE
Status: COMPLETED | OUTPATIENT
Start: 2022-03-07 | End: 2022-03-07

## 2022-03-07 RX ORDER — ONDANSETRON 2 MG/ML
4 INJECTION INTRAMUSCULAR; INTRAVENOUS EVERY 30 MIN PRN
Status: DISCONTINUED | OUTPATIENT
Start: 2022-03-07 | End: 2022-03-07 | Stop reason: HOSPADM

## 2022-03-07 RX ORDER — ONDANSETRON 4 MG/1
4 TABLET, ORALLY DISINTEGRATING ORAL EVERY 30 MIN PRN
Status: DISCONTINUED | OUTPATIENT
Start: 2022-03-07 | End: 2022-03-07 | Stop reason: HOSPADM

## 2022-03-07 RX ORDER — BUPIVACAINE HYDROCHLORIDE 7.5 MG/ML
INJECTION, SOLUTION INTRASPINAL
Status: COMPLETED | OUTPATIENT
Start: 2022-03-07 | End: 2022-03-07

## 2022-03-07 RX ORDER — ONDANSETRON 4 MG/1
4 TABLET, ORALLY DISINTEGRATING ORAL EVERY 6 HOURS PRN
Status: DISCONTINUED | OUTPATIENT
Start: 2022-03-07 | End: 2022-03-09 | Stop reason: HOSPADM

## 2022-03-07 RX ORDER — AMOXICILLIN 250 MG
1 CAPSULE ORAL 2 TIMES DAILY
Status: DISCONTINUED | OUTPATIENT
Start: 2022-03-07 | End: 2022-03-09 | Stop reason: HOSPADM

## 2022-03-07 RX ORDER — HYDRALAZINE HYDROCHLORIDE 20 MG/ML
10 INJECTION INTRAMUSCULAR; INTRAVENOUS EVERY 6 HOURS PRN
Status: DISCONTINUED | OUTPATIENT
Start: 2022-03-07 | End: 2022-03-09 | Stop reason: HOSPADM

## 2022-03-07 RX ORDER — LANOLIN ALCOHOL/MO/W.PET/CERES
1000 CREAM (GRAM) TOPICAL DAILY
COMMUNITY

## 2022-03-07 RX ORDER — ACETAMINOPHEN 325 MG/1
975 TABLET ORAL EVERY 8 HOURS
Status: DISCONTINUED | OUTPATIENT
Start: 2022-03-07 | End: 2022-03-09 | Stop reason: HOSPADM

## 2022-03-07 RX ORDER — LABETALOL HYDROCHLORIDE 5 MG/ML
20 INJECTION, SOLUTION INTRAVENOUS ONCE
Status: DISCONTINUED | OUTPATIENT
Start: 2022-03-07 | End: 2022-03-07

## 2022-03-07 RX ORDER — ONDANSETRON 2 MG/ML
INJECTION INTRAMUSCULAR; INTRAVENOUS PRN
Status: DISCONTINUED | OUTPATIENT
Start: 2022-03-07 | End: 2022-03-07

## 2022-03-07 RX ORDER — SODIUM CHLORIDE, SODIUM LACTATE, POTASSIUM CHLORIDE, CALCIUM CHLORIDE 600; 310; 30; 20 MG/100ML; MG/100ML; MG/100ML; MG/100ML
INJECTION, SOLUTION INTRAVENOUS CONTINUOUS
Status: DISCONTINUED | OUTPATIENT
Start: 2022-03-07 | End: 2022-03-08

## 2022-03-07 RX ORDER — CEFAZOLIN SODIUM 2 G/100ML
2 INJECTION, SOLUTION INTRAVENOUS SEE ADMIN INSTRUCTIONS
Status: DISCONTINUED | OUTPATIENT
Start: 2022-03-07 | End: 2022-03-07 | Stop reason: HOSPADM

## 2022-03-07 RX ORDER — HYDROMORPHONE HCL IN WATER/PF 6 MG/30 ML
0.2 PATIENT CONTROLLED ANALGESIA SYRINGE INTRAVENOUS EVERY 5 MIN PRN
Status: DISCONTINUED | OUTPATIENT
Start: 2022-03-07 | End: 2022-03-07 | Stop reason: HOSPADM

## 2022-03-07 RX ORDER — LABETALOL HYDROCHLORIDE 5 MG/ML
5 INJECTION, SOLUTION INTRAVENOUS ONCE
Status: DISCONTINUED | OUTPATIENT
Start: 2022-03-07 | End: 2022-03-07

## 2022-03-07 RX ORDER — PROCHLORPERAZINE 25 MG
12.5 SUPPOSITORY, RECTAL RECTAL EVERY 12 HOURS PRN
Status: DISCONTINUED | OUTPATIENT
Start: 2022-03-07 | End: 2022-03-09 | Stop reason: HOSPADM

## 2022-03-07 RX ORDER — BISACODYL 10 MG
10 SUPPOSITORY, RECTAL RECTAL DAILY PRN
Status: DISCONTINUED | OUTPATIENT
Start: 2022-03-07 | End: 2022-03-09 | Stop reason: HOSPADM

## 2022-03-07 RX ORDER — MULTIVIT-MIN/IRON/FOLIC ACID/K 18-600-40
1 CAPSULE ORAL DAILY
COMMUNITY

## 2022-03-07 RX ORDER — ACETAMINOPHEN 650 MG/1
650 SUPPOSITORY RECTAL EVERY 6 HOURS PRN
Status: DISCONTINUED | OUTPATIENT
Start: 2022-03-07 | End: 2022-03-09 | Stop reason: HOSPADM

## 2022-03-07 RX ORDER — FENTANYL CITRATE 50 UG/ML
INJECTION, SOLUTION INTRAMUSCULAR; INTRAVENOUS PRN
Status: DISCONTINUED | OUTPATIENT
Start: 2022-03-07 | End: 2022-03-07

## 2022-03-07 RX ORDER — KETAMINE HYDROCHLORIDE 50 MG/ML
INJECTION, SOLUTION INTRAMUSCULAR; INTRAVENOUS PRN
Status: DISCONTINUED | OUTPATIENT
Start: 2022-03-07 | End: 2022-03-07

## 2022-03-07 RX ADMIN — ONDANSETRON 4 MG: 2 INJECTION INTRAMUSCULAR; INTRAVENOUS at 21:36

## 2022-03-07 RX ADMIN — BUPIVACAINE HYDROCHLORIDE IN DEXTROSE 1.5 ML: 7.5 INJECTION, SOLUTION SUBARACHNOID at 20:05

## 2022-03-07 RX ADMIN — KETAMINE HYDROCHLORIDE 10 MG: 50 INJECTION, SOLUTION INTRAMUSCULAR; INTRAVENOUS at 19:57

## 2022-03-07 RX ADMIN — FENTANYL CITRATE 50 MCG: 50 INJECTION, SOLUTION INTRAMUSCULAR; INTRAVENOUS at 19:57

## 2022-03-07 RX ADMIN — PHENYLEPHRINE HYDROCHLORIDE 0.3 MCG/KG/MIN: 10 INJECTION INTRAVENOUS at 20:25

## 2022-03-07 RX ADMIN — CEFAZOLIN SODIUM 2 G: 2 INJECTION, SOLUTION INTRAVENOUS at 19:53

## 2022-03-07 RX ADMIN — Medication 2.5 MG: at 21:00

## 2022-03-07 RX ADMIN — ACETAMINOPHEN 975 MG: 325 TABLET ORAL at 22:13

## 2022-03-07 RX ADMIN — HYDROMORPHONE HYDROCHLORIDE 1 MG: 1 INJECTION, SOLUTION INTRAMUSCULAR; INTRAVENOUS; SUBCUTANEOUS at 09:25

## 2022-03-07 RX ADMIN — MIDAZOLAM 0.5 MG: 1 INJECTION INTRAMUSCULAR; INTRAVENOUS at 20:03

## 2022-03-07 RX ADMIN — Medication 2.5 MG: at 20:51

## 2022-03-07 RX ADMIN — MAGNESIUM SULFATE HEPTAHYDRATE 4 G: 80 INJECTION, SOLUTION INTRAVENOUS at 19:34

## 2022-03-07 RX ADMIN — OXYCODONE HYDROCHLORIDE 5 MG: 5 TABLET ORAL at 22:13

## 2022-03-07 RX ADMIN — SODIUM CHLORIDE, POTASSIUM CHLORIDE, SODIUM LACTATE AND CALCIUM CHLORIDE: 600; 310; 30; 20 INJECTION, SOLUTION INTRAVENOUS at 22:10

## 2022-03-07 RX ADMIN — Medication 2.5 MG: at 21:05

## 2022-03-07 RX ADMIN — PHENYLEPHRINE HYDROCHLORIDE 100 MCG: 10 INJECTION INTRAVENOUS at 20:22

## 2022-03-07 RX ADMIN — MIDAZOLAM 0.5 MG: 1 INJECTION INTRAMUSCULAR; INTRAVENOUS at 19:59

## 2022-03-07 RX ADMIN — HYDROMORPHONE HYDROCHLORIDE 0.5 MG: 1 INJECTION, SOLUTION INTRAMUSCULAR; INTRAVENOUS; SUBCUTANEOUS at 15:48

## 2022-03-07 RX ADMIN — DEXAMETHASONE SODIUM PHOSPHATE 4 MG: 10 INJECTION, SOLUTION INTRAMUSCULAR; INTRAVENOUS at 20:30

## 2022-03-07 RX ADMIN — SODIUM CHLORIDE, POTASSIUM CHLORIDE, SODIUM LACTATE AND CALCIUM CHLORIDE: 600; 310; 30; 20 INJECTION, SOLUTION INTRAVENOUS at 19:32

## 2022-03-07 RX ADMIN — Medication 2.5 MG: at 20:56

## 2022-03-07 RX ADMIN — PROPOFOL 30 MCG/KG/MIN: 10 INJECTION, EMULSION INTRAVENOUS at 20:10

## 2022-03-07 RX ADMIN — KETAMINE HYDROCHLORIDE 5 MG: 50 INJECTION, SOLUTION INTRAMUSCULAR; INTRAVENOUS at 20:02

## 2022-03-07 RX ADMIN — FENTANYL CITRATE 25 MCG: 50 INJECTION, SOLUTION INTRAMUSCULAR; INTRAVENOUS at 20:00

## 2022-03-07 RX ADMIN — PHENYLEPHRINE HYDROCHLORIDE 100 MCG: 10 INJECTION INTRAVENOUS at 20:17

## 2022-03-07 RX ADMIN — PHENYLEPHRINE HYDROCHLORIDE 100 MCG: 10 INJECTION INTRAVENOUS at 20:19

## 2022-03-07 RX ADMIN — Medication 2.5 MG: at 21:13

## 2022-03-07 RX ADMIN — Medication 2.5 MG: at 21:09

## 2022-03-07 RX ADMIN — KETAMINE HYDROCHLORIDE 5 MG: 50 INJECTION, SOLUTION INTRAMUSCULAR; INTRAVENOUS at 20:00

## 2022-03-07 ASSESSMENT — ACTIVITIES OF DAILY LIVING (ADL)
ADLS_ACUITY_SCORE: 14
ADLS_ACUITY_SCORE: 13
ADLS_ACUITY_SCORE: 12
ADLS_ACUITY_SCORE: 13
ADLS_ACUITY_SCORE: 13
ADLS_ACUITY_SCORE: 12
DEPENDENT_IADLS:: INDEPENDENT
ADLS_ACUITY_SCORE: 13
ADLS_ACUITY_SCORE: 12
ADLS_ACUITY_SCORE: 12

## 2022-03-07 ASSESSMENT — LIFESTYLE VARIABLES: TOBACCO_USE: 1

## 2022-03-07 NOTE — ED PROVIDER NOTES
EMERGENCY DEPARTMENT ENCOUNTER      NAME: Nereyda Vincent  AGE: 77 year old female  YOB: 1945  MRN: [id number]  EVALUATION DATE & TIME: 3/7/2022  8:58 AM    PCP: Gissel Hollis    ED PROVIDER: Mike Lee D.O.      Chief Complaint   Patient presents with     Fall       FINAL IMPRESSION:  1. Closed fracture of left hip, initial encounter (H)    2. Hip fracture, left, closed, initial encounter (H)        ED COURSE & MEDICAL DECISION MAKIN:55 AM I met with the patient to gather history and to perform my initial exam. I discussed the plan for care while in the Emergency Department.  11:37 AM RN informed me patient's blood pressure has been trending up.   12:34 PM I rechecked and updated the patient on results.   12:40 PM I spoke with Dr. Hicks, [facility]. Plan for surgery today at 7PM.  12:45 PM I spoke with Dr. Jasso, hospitalist who accepts patient for admission.        Pertinent Labs & Imaging studies reviewed. (See chart for details)  77 year old female presents to the Emergency Department for evaluation of left hip pain status post mechanical fall.  Patient was externally rotated and shortened.  X-rays do confirm fracture.  No additional injuries are noted based on exam or history.  Patient will be admitted for surgical intervention later today.  Discussed with orthopedic surgery and the hospitalist for admission    At the conclusion of the encounter I discussed the results of all of the tests and the disposition. The questions were answered. The patient or family acknowledged understanding and was agreeable with the care plan.      HPI    Patient information was obtained from: patient, EMS    Use of : N/A      Nereyda Vincent is a 77 year old female who presents for evaluation of fall. Per EMS, just prior to arrival the patient slipped on ice and fell. She was outside for 10-15 minutes prior to contacting her son. Patient denies hitting head. Endorses left hip pain.  Not anticoagulated. No surgical or medical history. She smokes tobacco, no alcohol use. Denies cough, congestion, sore throat, nausea, vomiting, diarrhea, headache, abdominal pain, chest pain, sensory changes.     REVIEW OF SYSTEMS  Constitutional:  Denies fever, chills, weight loss or weakness  Eyes:  No pain, discharge, redness  HENT:  Denies sore throat, ear pain, congestion  Respiratory: No SOB, wheeze or cough  Cardiovascular:  No CP, palpitations  GI:  Denies abdominal pain, nausea, vomiting, diarrhea  : Denies dysuria, hematuria  Musculoskeletal:  Positive for left hip pain. Denies any new muscle pain, swelling or loss of function.  Skin:  Denies rash, pallor  Neurologic:  Denies headache, focal weakness or sensory changes  Lymph: Denies swollen nodes    All other systems negative unless noted in HPI.    PAST MEDICAL HISTORY:  Past Medical History:   Diagnosis Date     Elevated blood pressure 4/26/2016       PAST SURGICAL HISTORY:  No past surgical history on file.      CURRENT MEDICATIONS:    No current facility-administered medications for this encounter.     Current Outpatient Medications   Medication     cyanocobalamin (VITAMIN B-12) 1000 MCG tablet     Vitamin D, Cholecalciferol, 25 MCG (1000 UT) TABS         ALLERGIES:  Allergies   Allergen Reactions     Codeine Nausea and Vomiting and Nausea       FAMILY HISTORY:  Family History   Problem Relation Age of Onset     Breast Cancer Mother 60.00     Colon Cancer Mother      Dementia Mother      Diabetes Father        SOCIAL HISTORY:  Social History     Socioeconomic History     Marital status:      Spouse name: Not on file     Number of children: Not on file     Years of education: Not on file     Highest education level: Not on file   Occupational History     Not on file   Tobacco Use     Smoking status: Current Every Day Smoker     Packs/day: 0.50     Smokeless tobacco: Never Used   Substance and Sexual Activity     Alcohol use: No     Drug use:  "Never     Sexual activity: Never   Other Topics Concern     Not on file   Social History Narrative     Not on file     Social Determinants of Health     Financial Resource Strain: Not on file   Food Insecurity: Not on file   Transportation Needs: Not on file   Physical Activity: Not on file   Stress: Not on file   Social Connections: Not on file   Intimate Partner Violence: Not on file   Housing Stability: Not on file       VITALS:  Patient Vitals for the past 24 hrs:   BP Temp Pulse Resp SpO2 Height Weight   03/07/22 1215 (!) 148/65 -- 66 -- 94 % -- --   03/07/22 1135 (!) 208/86 -- 69 -- 97 % -- --   03/07/22 1130 (!) 225/94 -- 77 -- 99 % -- --   03/07/22 1100 (!) 208/81 -- 65 -- 98 % -- --   03/07/22 1030 (!) 200/82 -- 69 -- 95 % -- --   03/07/22 1000 (!) 194/81 -- 76 -- 96 % -- --   03/07/22 0930 (!) 184/80 -- -- -- -- -- --   03/07/22 0908 (!) 179/77 98.5  F (36.9  C) 58 18 100 % 1.549 m (5' 1\") 58.1 kg (128 lb)   03/07/22 0900 (!) 179/77 -- -- -- -- -- --       PHYSICAL EXAM    VITAL SIGNS: BP (!) 148/65   Pulse 66   Temp 98.5  F (36.9  C)   Resp 18   Ht 1.549 m (5' 1\")   Wt 58.1 kg (128 lb)   SpO2 94%   BMI 24.19 kg/m      General Appearance: Well-appearing, well-nourished, no acute distress   Head:  Normocephalic, without obvious abnormality, atraumatic  Eyes:  PERRL, conjunctiva/corneas clear, EOM's intact,  ENT:  Lips, mucosa, and tongue normal, membranes are moist without pallor  Neck:  Normal ROM, symmetrical, trachea midline    Cardio:  Regular rate and rhythm, no murmur, rub or gallop, 2+ pulses symmetric in all extremities  Pulm:  Clear to auscultation bilaterally, respirations unlabored,  Abdomen:  Soft, non-tender, no rebound or guarding.  Musculoskeletal: Tenderness over left hip which is shortened and externally rotated.  No edema, no cyanosis, neurovascularly intact distally  Integument:  Warm, Dry, No erythema, No rash.    Neurologic:  Alert & oriented.  No focal deficits " appreciated.  Psychiatric:  Affect normal, Judgment normal, Mood normal.      LABS  Results for orders placed or performed during the hospital encounter of 03/07/22 (from the past 24 hour(s))   CBC with platelets + differential    Narrative    The following orders were created for panel order CBC with platelets + differential.  Procedure                               Abnormality         Status                     ---------                               -----------         ------                     CBC with platelets and d...[[id number]]                      Final result                 Please view results for these tests on the individual orders.   Basic metabolic panel   Result Value Ref Range    Sodium 139 136 - 145 mmol/L    Potassium 3.9 3.5 - 5.0 mmol/L    Chloride 105 98 - 107 mmol/L    Carbon Dioxide (CO2) 19 (L) 22 - 31 mmol/L    Anion Gap 15 5 - 18 mmol/L    Urea Nitrogen 12 8 - 28 mg/dL    Creatinine 1.01 0.60 - 1.10 mg/dL    Calcium 10.4 8.5 - 10.5 mg/dL    Glucose 138 (H) 70 - 125 mg/dL    GFR Estimate 58 (L) >60 mL/min/1.73m2   CBC with platelets and differential   Result Value Ref Range    WBC Count 10.5 4.0 - 11.0 10e3/uL    RBC Count 4.65 3.80 - 5.20 10e6/uL    Hemoglobin 15.2 11.7 - 15.7 g/dL    Hematocrit 44.2 35.0 - 47.0 %    MCV 95 78 - 100 fL    MCH 32.7 26.5 - 33.0 pg    MCHC 34.4 31.5 - 36.5 g/dL    RDW 13.0 10.0 - 15.0 %    Platelet Count 327 150 - 450 10e3/uL    % Neutrophils 75 %    % Lymphocytes 17 %    % Monocytes 6 %    % Eosinophils 1 %    % Basophils 1 %    % Immature Granulocytes 0 %    NRBCs per 100 WBC 0 <1 /100    Absolute Neutrophils 8.0 1.6 - 8.3 10e3/uL    Absolute Lymphocytes 1.8 0.8 - 5.3 10e3/uL    Absolute Monocytes 0.6 0.0 - 1.3 10e3/uL    Absolute Eosinophils 0.1 0.0 - 0.7 10e3/uL    Absolute Basophils 0.1 0.0 - 0.2 10e3/uL    Absolute Immature Granulocytes 0.0 <=0.4 10e3/uL    Absolute NRBCs 0.0 10e3/uL   XR Pelvis and Hip Left 2 Views    Narrative    EXAM: XR PELVIS AND  HIP LEFT 2 VIEWS  LOCATION: [facility]  DATE/TIME: 3/7/2022 11:23 AM    INDICATION: Left hip pain.  COMPARISON: None.      Impression    IMPRESSION: Acute comminuted moderately displaced intertrochanteric fracture of the left femur with resulting varus deformity. No additional fractures. No degenerative changes.   XR Chest 1 View    Narrative    EXAM: XR CHEST 1 VIEW  LOCATION: [facility]  DATE/TIME: 3/7/2022 11:23 AM    INDICATION: Pre op. Hip fracture.  COMPARISON: None.      Impression    IMPRESSION: The lungs are clear. The heart size and pulmonary vascularity are normal. There is no pneumothorax or pleural effusion.         RADIOLOGY  XR Chest 1 View   Final Result   IMPRESSION: The lungs are clear. The heart size and pulmonary vascularity are normal. There is no pneumothorax or pleural effusion.      XR Pelvis and Hip Left 2 Views   Final Result   IMPRESSION: Acute comminuted moderately displaced intertrochanteric fracture of the left femur with resulting varus deformity. No additional fractures. No degenerative changes.           MEDICATIONS GIVEN IN THE EMERGENCY:  Medications   HYDROmorphone (DILAUDID) injection 1 mg (1 mg Intravenous Given 3/7/22 0925)       Mike Lee D.O.  Emergency Medicine  [facility] EMERGENCY ROOM  [facility] [facility] [address] [phone]  Dept: [phone]     Mike Lee DO  03/07/22 1422

## 2022-03-07 NOTE — PHARMACY-ADMISSION MEDICATION HISTORY
Pharmacy Note - Admission Medication History    Pertinent Provider Information:    ______________________________________________________________________    Prior To Admission (PTA) med list completed and updated in EMR.       PTA Med List   Medication Sig Last Dose     cyanocobalamin (VITAMIN B-12) 1000 MCG tablet Take 1,000 mcg by mouth daily 3/7/2022     Vitamin D, Cholecalciferol, 25 MCG (1000 UT) TABS Take 1 tablet by mouth daily 3/7/2022       Information source(s): Patient    Method of interview communication: in-person    Patient was asked about OTC/herbal products specifically.  PTA med list reflects this.    Based on the pharmacist's assessment, the PTA med list information appears reliable    Allergies were reviewed, assessed, and updated with the patient.      Patient does not use any multi-dose medications prior to admission.     Thank you for the opportunity to participate in the care of this patient.      Karina Velázquez RPH     3/7/2022     1:09 PM

## 2022-03-07 NOTE — CONSULTS
Care Management Initial Consult    General Information  Assessment completed with: Patient, Children, Met with patient and daughter Priscila.  Type of CM/SW Visit: Initial Assessment    Primary Care Provider verified and updated as needed: Yes   Readmission within the last 30 days: no previous admission in last 30 days         Advance Care Planning: Advance Care Planning Reviewed: no concerns identified          Communication Assessment  Patient's communication style: spoken language (English or Bilingual)             Cognitive  Cognitive/Neuro/Behavioral:                        Living Environment:   People in home: child(dheeraj), adult  There are 5 adults living in her house.  Current living Arrangements: house      Able to return to prior arrangements: other (see comments) (To be determined)       Family/Social Support:  Care provided by: self  Provides care for: no one  Marital Status:   Children          Description of Support System: Supportive    Support Assessment: Adequate family and caregiver support    Current Resources:   Patient receiving home care services: No     Community Resources: None  Equipment currently used at home: none  Supplies currently used at home: None    Employment/Financial:  Employment Status: retired        Financial Concerns: other (see comments) (Family is concerned about insurance coverage of surgery.)   Referral to Financial Worker: No       Lifestyle & Psychosocial Needs:  Social Determinants of Health     Tobacco Use: High Risk     Smoking Tobacco Use: Current Every Day Smoker     Smokeless Tobacco Use: Never Used   Alcohol Use: Not on file   Financial Resource Strain: Not on file   Food Insecurity: Not on file   Transportation Needs: Not on file   Physical Activity: Not on file   Stress: Not on file   Social Connections: Not on file   Intimate Partner Violence: Not on file   Depression: Not at risk     PHQ-2 Score: 0   Housing Stability: Not on file       Functional  Status:  Prior to admission patient needed assistance:   Dependent ADLs:: Independent  Dependent IADLs:: Independent  Assesssment of Functional Status: Not at baseline with ADL Functioning, Not at baseline with mobility, Not at  functional baseline    Mental Health Status:          Chemical Dependency Status:                Values/Beliefs:  Spiritual, Cultural Beliefs, Advent Practices, Values that affect care:                 Additional Information:  Assessed in ED. Writer met with patient and her daughter Priscila and reviewed role of care management services, discuss goals of care and assess for any needs or services at discharge.    Patient lives with her daughter and other adult children in a house. She states she is independent. Today, she drove her daughter to work and when she got home she fell on the ice.     Patient has many available family members that can help her at home. They are hoping she can go home. One of them have been a HHA. Will need to follow progress and therapy recommendations. Possible need for TCU at discharge.        ALISIA BENNETT RN

## 2022-03-07 NOTE — ED NOTES
Bed: [facility]-01  Expected date: 3/7/22  Expected time: 8:54 AM  Means of arrival: Ambulance  Comments:  Fall hip pain, leg shortened and rotated.

## 2022-03-07 NOTE — H&P
[facility]    History and Physical - Hospitalist Service       Date of Admission:  3/7/2022    Assessment & Plan      Nereyda Vincent is a 77 year old female admitted on 3/7/2022. She fell 3/7/22 after slipping on ice, sustaining displaced L femur fracture.     Mechanical fall  Left femur fracture  X-ray pelvis and hip 3/7/2022 moderately displaced intratrochanteric fracture of left femur with no additional fractures, Ortho plan for fixation today.  Anticipate PT OT evaluation tomorrow    N.p.o. until procedure    Pain control with Dilaudid as needed    Bedrest    EKG for preop clearance    Tobacco use    Nicotine patch 14 mg daily    Hypertension  Likely secondary to pain, no history of hypertension but has infrequent outpatient care    Hydralazine as needed for SBP > 180 or DBP >110       Diet: NPO per Anesthesia Guidelines for Procedure/Surgery Except for: Meds, Other; Specify: mouth sponge    DVT Prophylaxis: Pneumatic Compression Devices  Hassan Catheter: PRESENT, indication:    Central Lines: None  Cardiac Monitoring: None  Code Status: Full Code    Clinically Significant Risk Factors Present on Admission          # Hypercalcemia: Ca = 10.4 mg/dL (Ref range: 8.5 - 10.5 mg/dL) and/or iCa = N/A on admission, will monitor as appropriate   # Anion Gap Metabolic Acidosis: AG = 15 mmol/L (Ref range: 5 - 18 mmol/L) on admission, will monitor and treat as appropriate          Disposition Plan   Expected Discharge:    Anticipated discharge location:  Awaiting care coordination huddle  Delays:          The patient's care was discussed with the Attending Physician, Dr. Jacobo Jasso.    Wesley Smallwood, DO  Hospitalist Service  [facility]  Securely message with the Vocera Web Console (learn more here)  Text page via "[employer] Paging/Directory         ______________________________________________________________________    Chief Complaint   Mechanical fall, left femur  fracture    History is obtained from the patient    History of Present Illness   Nereyda Vincent is a 77 year old female with no pertinent medical history who had a mechanical fall after slipping on ice today, injuring her left hip. Denies hitting head, denies loss of consciousness, not anticoagulated, no syncopal or presyncopal symptoms prior to fall.    Does not have regular outpatient care but denies any chronic medical issues, does not take any medications for any reason.  Usually stable on her feet, recent fall few months ago after falling off a stepstool.    [facility], Dr. Hicks, consulted in ER, who plans for fixation later this afternoon.    Recent positive COVID test 1/22/22 but has since recovered with no lingering effects.    Review of Systems    The 10 point Review of Systems is negative other than noted in the HPI or here.  Right leg pain    Past Medical History    I have reviewed this patient's medical history and updated it with pertinent information if needed.   Past Medical History:   Diagnosis Date     Elevated blood pressure 4/26/2016       Past Surgical History   I have reviewed this patient's surgical history and updated it with pertinent information if needed.  No past surgical history on file.    Social History   I have reviewed this patient's social history and updated it with pertinent information if needed.  Social History     Tobacco Use     Smoking status: Current Every Day Smoker     Packs/day: 0.50     Smokeless tobacco: Never Used   Substance Use Topics     Alcohol use: No     Drug use: Never       Family History   I have reviewed this patient's family history and updated it with pertinent information if needed.  Family History   Problem Relation Age of Onset     Breast Cancer Mother 60.00     Colon Cancer Mother      Dementia Mother      Diabetes Father        Prior to Admission Medications   Prior to Admission Medications   Prescriptions Last Dose Informant Patient  Reported? Taking?   Vitamin D, Cholecalciferol, 25 MCG (1000 UT) TABS 3/7/2022  Yes Yes   Sig: Take 1 tablet by mouth daily   cyanocobalamin (VITAMIN B-12) 1000 MCG tablet 3/7/2022  Yes Yes   Sig: Take 1,000 mcg by mouth daily      Facility-Administered Medications: None     Allergies   Allergies   Allergen Reactions     Codeine Nausea and Vomiting and Nausea       Physical Exam   Vital Signs: Temp: 97.3  F (36.3  C) Temp src: Oral BP: (!) 186/74 Pulse: 80   Resp: 18 SpO2: 97 % O2 Device: None (Room air)    Weight: 128 lbs 0 oz    Constitutional: awake, alert, cooperative, no apparent distress, and appears stated age  Eyes: Lids and lashes normal, pupils equal, round and reactive to light, extra ocular muscles intact, sclera clear, conjunctiva normal  ENT: Normocephalic, without obvious abnormality, atraumatic, sinuses nontender on palpation, external ears without lesions, oral pharynx with moist mucous membranes, tonsils without erythema or exudates, gums normal and good dentition.  Respiratory: No increased work of breathing, good air exchange, clear to auscultation bilaterally, no crackles or wheezing  Cardiovascular: Normal apical impulse, regular rate and rhythm, normal S1 and S2, no S3 or S4. 1/6 holosystolic murmur  GI: No scars, normal bowel sounds, soft, non-distended, non-tender, no masses palpated, no hepatosplenomegally  Skin: no bruising or bleeding and normal skin color, texture, turgor  Musculoskeletal: Left leg painful with manipulation  there is no redness, warmth, or swelling of the joints  full range of motion noted of right leg  Neurologic: Awake, alert, oriented to name, place and time.  Sensory intact  Neuropsychiatric: General: normal, calm and normal eye contact    Data   Data reviewed today: I reviewed all medications, new labs and imaging results over the last 24 hours. I personally reviewed the Hip and pelvis x-ray image(s) showing Left intertrochanteric femur fracture.    Recent Labs    Lab 03/07/22  0931   WBC 10.5   HGB 15.2   MCV 95         POTASSIUM 3.9   CHLORIDE 105   CO2 19*   BUN 12   CR 1.01   ANIONGAP 15   LULI 10.4   *         Patient seen and examined, slip and fall, hip fracture  No history of dizziness or lightheadedness or chest pain or LOC  2 OR for ORIF  No modifiable risk factors, patient medically ready for surgery    Jacobo Jasso MD

## 2022-03-07 NOTE — ED TRIAGE NOTES
PT SLIPPED ON ICE CAUSING HER TO FALL INJURING HER LEFT HIP WHICH SHORTENED AND EXTERNALLY ROTATED

## 2022-03-07 NOTE — PLAN OF CARE
Orthopedic surgery consult received.  Left intertrochanteric hip fracture.  Plan for fixation tonight.  Please keep n.p.o.    Luke Hicks MD   [facility]  3/7/2022

## 2022-03-07 NOTE — CONSULTS
ORTHOPEDIC CONSULTATION    Consultation  Nereyda Rich,  1945, MRN [id number]    Hip fracture, left, closed, initial encounter (H) [S72.002A]    PCP: Gissel Hollis, [phone]   Code status:  No Order       Extended Emergency Contact Information  Primary Emergency Contact: Tatiana Appiah   [employer]  Home Phone: [phone]  Mobile Phone: [phone]  Relation: Daughter  Secondary Emergency Contact: MOE RICH  Home Phone: [phone]  Relation: Daughter         IMPRESSION:  Left acute moderately displaced intertrochanteric fracture      PLAN:  This patient was discussed with Dr. Hicks, on-call surgeon for [facility] and they are in agreement with the following plan. This fracture will need to undergo surgical fixation and surgery is scheduled tonight for 5 pm with Dr. Hicks. Keep NPO. Patient will be admitted to the floor. Patient and her daughters are in agreement with this plan.     Thank you for including [facility] in the care of Nereyda Rich. It has been a pleasure participating in her care.      CHIEF COMPLAINT: fall - left hip pain     HISTORY OF PRESENT ILLNESS:  The patient is seen in orthopedic consultation at the request of Dr. Lee.  The patient is a 77 year old female who presented to the ED after a fall on ice and immediate pain to the left hip. She was brought here by EMS and was found to have a left intertroch fracture. She denies hitting her head. She reports she is in pain which is exacerbated if she moves. She denies numbness tingling in the leg. She reports no prior injury to her hips. She denies any significant medical history.       ALLERGIES:   Review of patient's allergies indicates   Allergies   Allergen Reactions     Codeine Nausea and Vomiting and Nausea         MEDICATIONS UPON ADMISSION:  Medications were reviewed.  They include:   (Not in a hospital admission)        SOCIAL HISTORY:   she  reports that she has been smoking. She has been  smoking about 0.50 packs per day. She has never used smokeless tobacco. She reports that she does not drink alcohol and does not use drugs.      FAMILY HISTORY:  family history includes Breast Cancer (age of onset: 60.00) in her mother; Colon Cancer in her mother; Dementia in her mother; Diabetes in her father.      REVIEW OF SYSTEMS:   Reviewed with patient. See HPI, otherwise negative       PHYSICAL EXAMINATION:  Vitals: Temp:  [98.5  F (36.9  C)] 98.5  F (36.9  C)  Pulse:  [58-77] 66  Resp:  [18] 18  BP: (148-225)/(65-94) 148/65  SpO2:  [94 %-100 %] 94 %  General: On examination, the patient is appears to be in mild pain, awake and alert and oriented to person, place, time, and and general circumstances left leg externally rotated and shortened.   SKIN: There is no evidence of abrasions, erythema, ecchymosis.   Pulses:  dorsalis pedis pulse is intact and equal bilaterally  Sensation: intact and equal bilaterally to the distal lower extremities.  Tenderness: left hip   ROM: not tested due to known fx.   Motor: dorsiflexion plantarflexion intact.       RADIOGRAPHIC EVALUATION:  Personally reviewed.    EXAM: XR PELVIS AND HIP LEFT 2 VIEWS  LOCATION: [facility]  DATE/TIME: 3/7/2022 11:23 AM     INDICATION: Left hip pain.  COMPARISON: None.                                                                      IMPRESSION: Acute comminuted moderately displaced intertrochanteric fracture of the left femur with resulting varus deformity. No additional fractures. No degenerative changes.    PERTINENT LABS:  Lab Results: personally reviewed.       Hemoglobin 11.7 - 15.7 g/dL 15.2            Jennifer Rene PA-C  Date: 3/7/2022  Time: 2:08 PM    CC1:   Mike Lee DO    CC2:   Gissel Hollis

## 2022-03-08 ENCOUNTER — APPOINTMENT (OUTPATIENT)
Dept: PHYSICAL THERAPY | Facility: CLINIC | Age: 77
DRG: 481 | End: 2022-03-08
Payer: COMMERCIAL

## 2022-03-08 ENCOUNTER — APPOINTMENT (OUTPATIENT)
Dept: OCCUPATIONAL THERAPY | Facility: CLINIC | Age: 77
DRG: 481 | End: 2022-03-08
Payer: COMMERCIAL

## 2022-03-08 LAB
ATRIAL RATE - MUSE: 86 BPM
DIASTOLIC BLOOD PRESSURE - MUSE: NORMAL MMHG
GLUCOSE BLD-MCNC: 160 MG/DL (ref 70–125)
HGB BLD-MCNC: 13 G/DL (ref 11.7–15.7)
INTERPRETATION ECG - MUSE: NORMAL
P AXIS - MUSE: 78 DEGREES
PR INTERVAL - MUSE: 136 MS
QRS DURATION - MUSE: 128 MS
QT - MUSE: 446 MS
QTC - MUSE: 533 MS
R AXIS - MUSE: -2 DEGREES
SYSTOLIC BLOOD PRESSURE - MUSE: NORMAL MMHG
T AXIS - MUSE: 67 DEGREES
VENTRICULAR RATE- MUSE: 86 BPM

## 2022-03-08 PROCEDURE — 258N000003 HC RX IP 258 OP 636: Performed by: ORTHOPAEDIC SURGERY

## 2022-03-08 PROCEDURE — 250N000013 HC RX MED GY IP 250 OP 250 PS 637: Performed by: STUDENT IN AN ORGANIZED HEALTH CARE EDUCATION/TRAINING PROGRAM

## 2022-03-08 PROCEDURE — 99232 SBSQ HOSP IP/OBS MODERATE 35: CPT | Performed by: HOSPITALIST

## 2022-03-08 PROCEDURE — 97530 THERAPEUTIC ACTIVITIES: CPT | Mod: GP

## 2022-03-08 PROCEDURE — 120N000001 HC R&B MED SURG/OB

## 2022-03-08 PROCEDURE — 36415 COLL VENOUS BLD VENIPUNCTURE: CPT | Performed by: ORTHOPAEDIC SURGERY

## 2022-03-08 PROCEDURE — 250N000013 HC RX MED GY IP 250 OP 250 PS 637: Performed by: ORTHOPAEDIC SURGERY

## 2022-03-08 PROCEDURE — 97166 OT EVAL MOD COMPLEX 45 MIN: CPT | Mod: GO

## 2022-03-08 PROCEDURE — 97110 THERAPEUTIC EXERCISES: CPT | Mod: GP

## 2022-03-08 PROCEDURE — 250N000011 HC RX IP 250 OP 636: Performed by: ORTHOPAEDIC SURGERY

## 2022-03-08 PROCEDURE — 97535 SELF CARE MNGMENT TRAINING: CPT | Mod: GO

## 2022-03-08 PROCEDURE — 250N000011 HC RX IP 250 OP 636: Performed by: STUDENT IN AN ORGANIZED HEALTH CARE EDUCATION/TRAINING PROGRAM

## 2022-03-08 PROCEDURE — 82947 ASSAY GLUCOSE BLOOD QUANT: CPT | Performed by: INTERNAL MEDICINE

## 2022-03-08 PROCEDURE — 85018 HEMOGLOBIN: CPT | Performed by: ORTHOPAEDIC SURGERY

## 2022-03-08 PROCEDURE — 97162 PT EVAL MOD COMPLEX 30 MIN: CPT | Mod: GP

## 2022-03-08 RX ORDER — HYDROMORPHONE HCL IN WATER/PF 6 MG/30 ML
0.2 PATIENT CONTROLLED ANALGESIA SYRINGE INTRAVENOUS
Status: DISCONTINUED | OUTPATIENT
Start: 2022-03-08 | End: 2022-03-09 | Stop reason: HOSPADM

## 2022-03-08 RX ADMIN — NICOTINE 1 PATCH: 14 PATCH, EXTENDED RELEASE TRANSDERMAL at 00:13

## 2022-03-08 RX ADMIN — ACETAMINOPHEN 975 MG: 325 TABLET ORAL at 14:19

## 2022-03-08 RX ADMIN — SODIUM CHLORIDE, POTASSIUM CHLORIDE, SODIUM LACTATE AND CALCIUM CHLORIDE: 600; 310; 30; 20 INJECTION, SOLUTION INTRAVENOUS at 00:09

## 2022-03-08 RX ADMIN — HYDROMORPHONE HYDROCHLORIDE 0.2 MG: 0.2 INJECTION, SOLUTION INTRAMUSCULAR; INTRAVENOUS; SUBCUTANEOUS at 04:05

## 2022-03-08 RX ADMIN — CEFAZOLIN 1 G: 1 INJECTION, POWDER, FOR SOLUTION INTRAMUSCULAR; INTRAVENOUS at 11:48

## 2022-03-08 RX ADMIN — POLYETHYLENE GLYCOL 3350 17 G: 17 POWDER, FOR SOLUTION ORAL at 08:11

## 2022-03-08 RX ADMIN — ENOXAPARIN SODIUM 30 MG: 30 INJECTION SUBCUTANEOUS at 17:15

## 2022-03-08 RX ADMIN — CEFAZOLIN 1 G: 1 INJECTION, POWDER, FOR SOLUTION INTRAMUSCULAR; INTRAVENOUS at 03:34

## 2022-03-08 RX ADMIN — ACETAMINOPHEN 975 MG: 325 TABLET ORAL at 21:07

## 2022-03-08 RX ADMIN — ACETAMINOPHEN 975 MG: 325 TABLET ORAL at 05:14

## 2022-03-08 RX ADMIN — TRAMADOL HYDROCHLORIDE 25 MG: 50 TABLET, FILM COATED ORAL at 00:10

## 2022-03-08 RX ADMIN — TRAMADOL HYDROCHLORIDE 25 MG: 50 TABLET, FILM COATED ORAL at 08:17

## 2022-03-08 RX ADMIN — SENNOSIDES AND DOCUSATE SODIUM 1 TABLET: 50; 8.6 TABLET ORAL at 08:11

## 2022-03-08 RX ADMIN — SENNOSIDES AND DOCUSATE SODIUM 1 TABLET: 50; 8.6 TABLET ORAL at 21:07

## 2022-03-08 ASSESSMENT — ACTIVITIES OF DAILY LIVING (ADL)
ADLS_ACUITY_SCORE: 15
ADLS_ACUITY_SCORE: 13
ADLS_ACUITY_SCORE: 15
ADLS_ACUITY_SCORE: 15
ADLS_ACUITY_SCORE: 13
ADLS_ACUITY_SCORE: 15
ADLS_ACUITY_SCORE: 13
ADLS_ACUITY_SCORE: 15

## 2022-03-08 NOTE — ANESTHESIA POSTPROCEDURE EVALUATION
Patient: Nereyda Vincent    Procedure: Procedure(s):  INTERNAL FIXATION, FRACTURE, TROCHANTERIC, HIP, USING PINS OR RODS, USING HANA TABLE-LEFT       Anesthesia Type:  Spinal    Note:  Disposition: Inpatient   Postop Pain Control: Uneventful            Sign Out: Well controlled pain   PONV: No   Neuro/Psych: Uneventful            Sign Out: Acceptable/Baseline neuro status   Airway/Respiratory: Uneventful            Sign Out: Acceptable/Baseline resp. status   CV/Hemodynamics: Uneventful            Sign Out: Acceptable CV status; No obvious hypovolemia; No obvious fluid overload   Other NRE: NONE   DID A NON-ROUTINE EVENT OCCUR? No           Last vitals:  Vitals Value Taken Time   /71 03/07/22 2200   Temp 36.9  C (98.4  F) 03/07/22 2200   Pulse 83 03/07/22 2208   Resp 24 03/07/22 2208   SpO2 96 % 03/07/22 2208   Vitals shown include unvalidated device data.    Electronically Signed By: Daniele Madrid MD  March 7, 2022  10:09 PM

## 2022-03-08 NOTE — ANESTHESIA PREPROCEDURE EVALUATION
Anesthesia Pre-Procedure Evaluation    Patient: Nereyda Vincent   MRN: [id number] : 1945        Procedure : Procedure(s):  INTERNAL FIXATION, FRACTURE, TROCHANTERIC, HIP, USING PINS OR RODS, USING HANA TABLE-LEFT          Past Medical History:   Diagnosis Date     Elevated blood pressure 2016      No past surgical history on file.   Allergies   Allergen Reactions     Codeine Nausea and Vomiting and Nausea      Social History     Tobacco Use     Smoking status: Current Every Day Smoker     Packs/day: 0.50     Smokeless tobacco: Never Used   Substance Use Topics     Alcohol use: No      Wt Readings from Last 1 Encounters:   22 58.1 kg (128 lb)        Anesthesia Evaluation            ROS/MED HX  ENT/Pulmonary:  - neg pulmonary ROS   (+) tobacco use,     Neurologic:  - neg neurologic ROS     Cardiovascular:  - neg cardiovascular ROS   (+) hypertension-----    METS/Exercise Tolerance:     Hematologic:  - neg hematologic  ROS     Musculoskeletal:  - neg musculoskeletal ROS     GI/Hepatic:  - neg GI/hepatic ROS     Renal/Genitourinary:  - neg Renal ROS     Endo:  - neg endo ROS     Psychiatric/Substance Use:  - neg psychiatric ROS     Infectious Disease:  - neg infectious disease ROS     Malignancy:  - neg malignancy ROS     Other:  - neg other ROS          Physical Exam    Airway  airway exam normal      Mallampati: II   TM distance: > 3 FB   Neck ROM: full   Mouth opening: > 3 cm    Respiratory Devices and Support         Dental  no notable dental history         Cardiovascular   cardiovascular exam normal       Rhythm and rate: regular     Pulmonary   pulmonary exam normal        breath sounds clear to auscultation           OUTSIDE LABS:  CBC:   Lab Results   Component Value Date    WBC 10.5 2022    WBC 7.9 2021    HGB 15.2 2022    HGB 15.5 2021    HCT 44.2 2022    HCT 45.0 2021     2022     2021     BMP:   Lab Results   Component Value  Date     03/07/2022     05/26/2021    POTASSIUM 3.9 03/07/2022    POTASSIUM 4.1 05/26/2021    CHLORIDE 105 03/07/2022    CHLORIDE 104 05/26/2021    CO2 19 (L) 03/07/2022    CO2 25 05/26/2021    BUN 12 03/07/2022    BUN 15 05/26/2021    CR 1.01 03/07/2022    CR 0.96 05/26/2021     (H) 03/07/2022     05/26/2021     COAGS: No results found for: PTT, INR, FIBR  POC: No results found for: BGM, HCG, HCGS  HEPATIC: No results found for: ALBUMIN, PROTTOTAL, ALT, AST, GGT, ALKPHOS, BILITOTAL, BILIDIRECT, TERRANCE  OTHER:   Lab Results   Component Value Date    LULI 10.4 03/07/2022       Anesthesia Plan    ASA Status:  3      Anesthesia Type: Spinal.      Maintenance: TIVA.        Consents    Anesthesia Plan(s) and associated risks, benefits, and realistic alternatives discussed. Questions answered and patient/representative(s) expressed understanding.    - Discussed:     - Discussed with:  Patient         Postoperative Care            Comments:    Other Comments:   Fentanyl, ketamine for SAB.  Propofol gtt.            Daniele Madrid MD

## 2022-03-08 NOTE — PROGRESS NOTES
03/08/22 1100   Quick Adds   Type of Visit Initial Occupational Therapy Evaluation   Living Environment   People in Home child(dheeraj), adult   Current Living Arrangements house   Living Environment Comments pt was ind with all adls , iadls and driving prior   Self-Care   Usual Activity Tolerance good   Current Activity Tolerance poor   General Information   Onset of Illness/Injury or Date of Surgery 03/07/22   Referring Physician Dr Hicks   Patient/Family Therapy Goal Statement (OT) stop this pain   Existing Precautions/Restrictions fall   Left Lower Extremity (Weight-bearing Status) weight-bearing as tolerated (WBAT)   Cognitive Status Examination   Affect/Mental Status (Cognitive) WNL   Cognitive Status Comments needed directions repeated-may need slums   Bed Mobility   Comment (Bed Mobility) dependent   Transfers   Transfer Comments dependent   Activities of Daily Living   BADL Assessment/Intervention other (see comments)  (dependent for all due to pain and fear)   Clinical Impression   Criteria for Skilled Therapeutic Interventions Met (OT) Yes, treatment indicated   OT Diagnosis decreased ind with adls   OT Problem List-Impairments impacting ADL post-surgical precautions   Assessment of Occupational Performance 3-5 Performance Deficits   Identified Performance Deficits bed mob, transfers, dressing, toileting, standing at the sink   Planned Therapy Interventions (OT) ADL retraining;bed mobility training;transfer training   Clinical Decision Making Complexity (OT) moderate complexity   Risk & Benefits of therapy have been explained care plan/treatment goals reviewed;participants included;patient;participants voiced agreement with care plan   OT Discharge Planning   OT Discharge Recommendation (DC Rec) Transitional Care Facility   OT Rationale for DC Rec needs assit with all adls and func mob.  Needs skilled OT intervention to recover.   Total Evaluation Time (Minutes)   Total Evaluation Time (Minutes) 15   OT  Goals   Therapy Frequency (OT) 2 times/day   OT Goals Lower Body Dressing;Bed Mobility;Toilet Transfer/Toileting;Hygiene/Grooming   OT: Hygiene/Grooming moderate assist;while standing   OT: Lower Body Dressing Moderate assist   OT: Bed Mobility Moderate assist   OT: Toilet Transfer/Toileting Moderate assist

## 2022-03-08 NOTE — PROGRESS NOTES
"Orthopedic Progress Note      Assessment: 1 Day Post-Op  S/P Procedure(s):  INTERNAL FIXATION, FRACTURE, TROCHANTERIC, HIP, USING PINS OR RODS, USING HANA TABLE-LEFT     Plan:   - Continue PT/OT  - Weightbearing status: WBAT  - Anticoagulation: lovenox 30 daily in addition to SCDs, martin stockings and early ambulation.  - Discharge planning: pending progression with therapy, pain control, Tcu placement      Subjective:  Pain: this AM was severe. Checked again later in the day. Pain is mild at rest, increases with movement.   Neuro:  Patient denies new onset numbness or paresthesias    Patient reports she has more pain than she expected this morning. I reassured her post op pain is normal. She is afraid to move much due to pain at this point and has not yet worked with therapy.  No specific concerns or other complaints.     Objective:  /78 (BP Location: Right arm)   Pulse 88   Temp 98.6  F (37  C) (Oral)   Resp 18   Ht 1.549 m (5' 1\")   Wt 58.1 kg (128 lb)   SpO2 92%   BMI 24.19 kg/m    The patient is A&Ox3. Appears comfortable sittig in bed.   Sensation is intact BLEs.   Dorsiflexion and plantar flexion is intact.  Dorsalis pedis pulse intact.  Calves are soft and non-tender.   The incision is covered. Dressing C/D/I.      Pertinent Labs   Lab Results: personally reviewed.   No results found for: INR, PROTIME  Lab Results   Component Value Date    WBC 10.5 03/07/2022    HGB 13.0 03/08/2022    HCT 44.2 03/07/2022    MCV 95 03/07/2022     03/07/2022     Lab Results   Component Value Date     03/07/2022    CO2 19 (L) 03/07/2022         Report completed by:  Jennifer Rene PA-C, [facility]TA  Date: 3/8/2022  Time: 4:47 PM    "

## 2022-03-08 NOTE — PROGRESS NOTES
Hospitalist Progress Note    Assessment/Plan    Active Problems:    Closed fracture of left hip, initial encounter (H)    Hip fracture, left, closed, initial encounter (H)  77-year-old patient with history of tobacco abuse, slipped on ice on March 7 and sustained pain in her left hip found to have left femoral fracture,    Mechanical fall with left femoral fracture  X-ray showed moderately displaced left intertrochanteric fracture with no additional fractures,  Underwent left hip with trochanteric nailing with a cephalomedullary device  PT and OT is following    Elevated blood pressure reading  She has no prior diagnosis of hypertension but her blood pressure was elevated yesterday today is better controlled, as needed hydralazine ordered    Tobacco use  Ordered nicotine patch      Barriers to Discharge: Improvement of ambulation, pain control    Anticipated discharge date/Disposition: TCU in 1 to 2 days    Subjective  Patient was seen this morning she was having significant pain and she was waiting to be evaluated by PT    Objective    Vital signs in last 24 hours  Temp:  [97.3  F (36.3  C)-99.1  F (37.3  C)] 98.6  F (37  C)  Pulse:  [80-95] 88  Resp:  [15-28] 18  BP: (110-202)/(55-90) 118/78  SpO2:  [90 %-100 %] 92 % [unfilled] O2 Device: None (Room air)    Weight:   [unfilled] Weight change:     Intake/Output last 3 shifts  I/O last 3 completed shifts:  In: 30 [P.O.:30]  Out: 700 [Urine:600; Blood:100]  Body mass index is 24.19 kg/m .    Physical Exam:  General Appearance: Alert, oriented x3, does not appear in distress.  HEENT: Normocephalic. No scleral icterus, . Mucous membranes moist.  Heart: :Regular rate and rhythm, normal S1 ,S2, No murmurs, no JVD, no pedal edema   Lungs: Clear to auscultation bilaterally. No wheezing or crackles  Abdomen: Soft, non tender, no rebound or rigidity, non distended, bowel sounds present.  Extremity: Range of motion of the left lower extremity secondary to  pain      Pertinent Labs   Lab Results: personally reviewed.   Recent Labs   Lab 03/07/22  0931      CO2 19*   BUN 12     Recent Labs   Lab 03/08/22  0605 03/07/22  0931   WBC  --  10.5   HGB 13.0 15.2   HCT  --  44.2   PLT  --  327     No results for input(s): CKTOTAL, TROPONINI in the last 168 hours.    Invalid input(s): TROPONINT, CKMBINDEX  Invalid input(s): POCGLUFGR    Medications  Drug and lactation database from the [place] [employer]:  http://toxnet.nlm.nih.gov/cgi-bin/sis/htmlgen?LACT        Advanced Care Planning:  Discharge Planning discussed with patient  Total time with this patient is 25 min with 50% of time spent in examining the patient, reviewing records, discussing plan of care and counseling, 50% of time spent in coordination of care.  Care discussed and coordinated with RN, care manager.      Deidre Calderón MD  Internal Medicine Hospitalist  3/8/2022

## 2022-03-08 NOTE — PLAN OF CARE
Goal Outcome Evaluation:    Pt NPO prior to surgery. Complains of pain to left hip, PRN IV dilaudid given for pain, with moderate relief. Hassan in place, draining urine. BP elevated, no PRN's given, surgery team will treat during the surgical case.

## 2022-03-08 NOTE — PLAN OF CARE
Problem: Bleeding (Hip Fracture Medical Management)  Goal: Absence of Bleeding  Outcome: Ongoing, Progressing     Problem: Pain (Hip Fracture Medical Management)  Goal: Acceptable Pain Level  Outcome: Ongoing, Progressing  Intervention: Manage Acute Orthopaedic-Related Pain  Recent Flowsheet Documentation  Taken 3/8/2022 0815 by Ellen Thao RN  Pain Management Interventions: therapeutic presence  Complementary Therapy: aromatherapy utilized   Goal Outcome Evaluation:    Reporting mild pain at rest, increased with activity.  Worked with PT to get up in chair. A x 1 for pivot transfers.  Discussed fear of pain increasing feeling of pain.  A/O.  Able to express needs.

## 2022-03-08 NOTE — PLAN OF CARE
"Goal Outcome Evaluation: manage pain and increase mobility             Patient vital signs are at baseline: Yes  Patient able to ambulate as they were prior to admission or with assist devices provided by therapies during their stay:  No,  Reason: Tried twice to get patient to dangle feet and try to walk assist of 2. Pain became intolerable when moving legs over side of bed and was unable to support head up. Tried IV Dilaudid after first time, but pain was still unmanageable during second attempt. When trying to move patient sitting up second time, patient said \"I'm worried by bones will crack.\" Patient verbalizes anxiety about trying to get up.  Patient MUST void prior to discharge:  No,  Reason:  Hassan was removed. Patient was unable to walk to toilet and doesn't want to turn at this time to try to use bedpan. Bladder scan done and was 28 mL. IV Lactated Ringer's infusing at 75 mL/hr. 175 mL in catheter before removal.   Patient able to tolerate oral intake:  Yes  Pain has adequate pain control using Oral analgesics:  Yes  Does patient have an identified :  Yes  Has goal D/C date and time been discussed with patient:  No,  Reason: Patient will see PT and OT today to be further evaluated for weakness and pain with mobility.     Janene Thompson RN     "

## 2022-03-08 NOTE — OP NOTE
Operative Report    PATIENT Nereyda Vincent   DATE OF SURGERY:  3/7/2022    PREOPERATIVE DIAGNOSIS   Closed fracture of left hip, initial encounter (H) [S72.002A].    POSTOPERATIVE DIAGNOSIS   Closed fracture of left hip, initial encounter (H) [S72.002A].    PROCEDURE PERFORMED   left hip trochanteric nailing with cephalomedullary device    IMPLANTS  Implant Name Type Inv. Item Serial No.  Lot No. LRB No. Used Action   IMP NAIL SYN CAN FEM PROX TFNA [id number] 130D 04.037.142S - [id number] Metallic Hardware/[employer] IMP NAIL SYN CAN FEM PROX TFNA [id number] 130D 04.037.142S  [employer] [id number] Left 1 Implanted   IMP SCR SYN TFNA FENESTRATED LAG 90MM 04.038.190S - [id number] Metallic Hardware/[employer] IMP SCR SYN TFNA FENESTRATED LAG 90MM 04.038.190S  [employer] [id number] Left 1 Implanted   IMP SCR SYN 5.0 TI LOCK T25 STARDRIVE 34MM 04.005.524 - [id number] Metallic Hardware/[employer] IMP SCR SYN 5.0 TI LOCK T25 STARDRIVE 34MM 04.005.524  [employer] N/A Left 1 Implanted   IMP SCR SYN 5.0 TI LOCK T25 STARDRIVE 36MM 04.005.526 - [id number] Metallic Hardware/[employer] IMP SCR SYN 5.0 TI LOCK T25 STARDRIVE 36MM 04.005.526  [employer] N/A Left 1 Wasted       SURGEON  Luke Hicks MD     ASSISTANT   Anh Thomas PA-C; assistant was required for patient positioning, surgical assistance, wound closure and monitoring patient's safety throughout the case.    ANESTHESIA  Choice      FINDINGS:  Left Intertrochanteric femur fracture    SPECIMENS:  none    ESTIMATED BLOOD LOSS:  100cc    COMPLICATIONS   None.        INDICATION FOR PROCEDURE  Nereyda Vincent is a 77 year old female who sustained a left intertrochanteric hip fracture after a mechanical fall.  This is an unstable fracture pattern requiring operative intervention.  The risks, benefits and expected outcomes of the procedure were discussed with the patient.  Questions were answered fully.  The patient signed informed consent prior to the  procedure.    INFORMED CONSENT  Nereyda Vincent was identified in the preoperative holding area and was identified using medical record number, name, and date of birth, all of which were confirmed. The operative hip was marked using an indelible marker. Once again, all risks and benefits as well as alternatives to surgical intervention were discussed with the patient, and her daughter Tatiana, in detail and all their questions were answered. Risks discussed included but were not limited to: persistent pain, infection, bleeding, scarring, stiffness, thromboembolic events, fracture, malalignment/malrotation, malunion/nonunion, implant complications, severe limb dysfunction, loss of limb, and loss of life. The patient signed informed consent and wished to proceed with surgery as scheduled.     DESCRIPTION OF PROCEDURE   Nereyda Vincent was brought back to the operating room.  Spinal anesthesia was achieved without difficulty.  The patient was then transferred to the fracture table.  Arms were tucked, and the nonoperative leg was placed in the contralateral boot and spar.  All bony prominences were well-padded.  The operative leg was placed in the traction boot.  Fluoroscopy was used to confirm good reduction on the fracture table on AP and lateral views.  The patient was then prepped and draped in the usual sterile fashion.    A timeout was performed prior to the procedure.  Three separate staff members confirmed the patient's name, correct site and side of surgery and procedure being performed.  Antibiotics were confirmed to be given prior to incision.  Implants were confirmed to be available and ready.    C-arm was used to guide an incision proximally above the tip of the trochanter.  I split the gluteal fascia and musculature and palpated the starting point for the guide pin and placed the guidepin.  Its position was confirmed on C-arm.  I then used the starting reamer to open the hole for the nail in the tip of  the trochanter.  Appropriate neck shaft angle nail was chosen based on the C-arm images. The short nail was then passed without difficulty.  I then made a separate incision for placement of the lag screw.  The appropriate aiming device was utilized and the pin was placed in a center center position.  This was confirmed on AP and lateral C-arm images and then it was measured for length and the lag screw was inserted to the appropriate depth.  Distal locking was performed through yet another incision.  After placement of this final images were obtained both at the hip and distally to assure acceptable hardware position.    I then performed a lavage, then closure of the wound in layers with 0 Vicryl for fascia, 2-0 Vicryl for the subcutaneous tissue, and then nylon sutures in the skin. The standard dressing was applied, and the patient was taken to recovery in stable condition.     POSTOPERATIVE PLAN:    Pain control  24 hours antibiotics  Enoxaparin,mechanical DVT ppx  WBAT LLE, PT/OT  Dispo planning  No further XR needed.    Luke Hicks MD  [facility]

## 2022-03-08 NOTE — PROGRESS NOTES
Care Management Follow Up    Length of Stay (days): 1    Expected Discharge Date: 03/09/2022     Concerns to be Addressed:  POD #1, pain      Patient plan of care discussed at interdisciplinary rounds: Yes    Anticipated Discharge Disposition: TCU      Anticipated Discharge Services: TBD    Anticipated Discharge DME:  TBD    Patient/family educated on Medicare website which has current facility and service quality ratings:yes      Education Provided on the Discharge Plan: CM met with patient and daughter (Tatiana- bedside).      Patient/Family in Agreement with the Plan:  yes    Referrals Placed by CM/SW:  pending      Additional Information:  Chart reviewed. CM met with patient. CM discussed with patient and daughter (Tatiana- bedside). Patient and family would like for patient to go to TCU. CM discussed TCU options and [employer] insurance with patient and daughter (Tatiana). CM will send TCU referrals per patient request.   CM. CM will follow for discharge planning.      Referrals sent 10:26 AM   [facility]  [facility]   [facility] - verifying insurance and can probably accept for 3/9/22. CM will follow up with TCU if CM doesn't hear from TCU.     Priya Figueroa RN

## 2022-03-08 NOTE — ANESTHESIA PROCEDURE NOTES
Intrathecal injection Procedure Note    Pre-Procedure   Staff -        Anesthesiologist:  Daniele Madrid MD       Performed By: anesthesiologist       Location: OR       Procedure Start/Stop Times: 3/7/2022 7:58 PM and 3/7/2022 8:05 PM       Pre-Anesthestic Checklist: patient identified, IV checked, risks and benefits discussed, informed consent, monitors and equipment checked, pre-op evaluation, at physician/surgeon's request and post-op pain management  Timeout:       Correct Patient: Yes        Correct Procedure: Yes        Correct Site: Yes        Correct Position: Yes   Procedure Documentation  Procedure: intrathecal injection       Patient Position: sitting       Patient Prep/Sterile Barriers: sterile gloves, mask, patient draped       Skin prep: Chloraprep       Insertion Site: L3-4. (midline approach).       Needle Gauge: 22.        Needle Length (Inches): 3.5        Spinal Needle Type: Quincke       Introducer used       Introducer: 20 G       # of attempts: 1 and  # of redirects:  1    Assessment/Narrative         Paresthesias: No.       CSF fluid: clear.      Opening pressure was cmH2O while  Lateral (RLD).      Medication(s) Administered   0.75% Hyperbaric Bupivacaine (Intrathecal), 1.5 mL  Medication Administration Time: 3/7/2022 8:05 PM

## 2022-03-08 NOTE — ANESTHESIA CARE TRANSFER NOTE
Patient: Nereyda Vincent    Procedure: Procedure(s):  INTERNAL FIXATION, FRACTURE, TROCHANTERIC, HIP, USING PINS OR RODS, USING HANA TABLE-LEFT       Diagnosis: Closed fracture of left hip, initial encounter (H) [S72.002A]  Diagnosis Additional Information: No value filed.    Anesthesia Type:   Spinal     Note:    Oropharynx: oropharynx clear of all foreign objects  Level of Consciousness: drowsy  Oxygen Supplementation: face mask  Level of Supplemental Oxygen (L/min / FiO2): 8  Independent Airway: airway patency satisfactory and stable  Dentition: dentition unchanged  Vital Signs Stable: post-procedure vital signs reviewed and stable  Report to RN Given: handoff report given  Patient transferred to: PACU    Handoff Report: Identifed the Patient, Identified the Reponsible Provider, Reviewed the pertinent medical history, Discussed the surgical course, Reviewed Intra-OP anesthesia mangement and issues during anesthesia, Set expectations for post-procedure period and Allowed opportunity for questions and acknowledgement of understanding      Vitals:  Vitals Value Taken Time   /65 03/07/22 2135   Temp 36.8    Pulse 92 03/07/22 2139   Resp 20 03/07/22 2139   SpO2 100 % 03/07/22 2139   Vitals shown include unvalidated device data.    Electronically Signed By: WADE Burks CRNA  March 7, 2022  9:40 PM

## 2022-03-08 NOTE — PLAN OF CARE
I met Nereyda in the preoperative area and discussed the situation with her daughter at length.  Unfortunately, she had a ground-level fall when getting out of her car today and has a left intertrochanteric femur fracture.  She is cleared for surgery.  Explained the benefits of surgery in terms of early mobilization to her daughter.  I also explained the risks of surgery including hardware complications, fracture nonunion, medical complications among other things.  Ultimately, they elect to proceed.  Plan for intramedullary femoral nailing tonight.    Luke Hicks MD   [facility]  3/7/2022

## 2022-03-08 NOTE — PROGRESS NOTES
03/08/22 0900   Quick Adds   Type of Visit Initial PT Evaluation   Living Environment   People in Home child(dheeraj), adult   Current Living Arrangements house   Home Accessibility stairs to enter home;stairs within home   Number of Stairs, Main Entrance 2   Stair Railings, Main Entrance none   Number of Stairs, Within Home, Primary ten   Stair Railings, Within Home, Primary railing on left side (ascending)   Self-Care   Equipment Currently Used at Home none   Fall history within last six months yes   Number of times patient has fallen within last six months 1   General Information   Onset of Illness/Injury or Date of Surgery 03/07/22   Referring Physician Dr. Luke Hicks   Patient/Family Therapy Goals Statement (PT) be able to move safely   Pertinent History of Current Problem (include personal factors and/or comorbidities that impact the POC) S/P L hip pinning after left hip fracture from fall on ice   Weight-Bearing Status - LLE weight-bearing as tolerated   Weight-Bearing Status - RLE full weight-bearing   Bed Mobility   Bed Mobility supine-sit   Supine-Sit Wirt (Bed Mobility) maximum assist (25% patient effort);2 person assist;verbal cues;nonverbal cues (demo/gesture)   Impairments Contributing to Impaired Bed Mobility pain;decreased strength   Assistive Device (Bed Mobility) bed rails;draw sheet   Transfers   Transfers sit-stand transfer   Maintains Weight-bearing Status (Transfers) verbal cues to maintain   Impairments Contributing to Impaired Transfers pain   Sit-Stand Transfer   Sit-Stand Wirt (Transfers) moderate assist (50% patient effort);2 person assist;verbal cues   Assistive Device (Sit-Stand Transfers) walker, front-wheeled   Gait/Stairs (Locomotion)   Wirt Level (Gait) minimum assist (75% patient effort);2 person assist;verbal cues   Assistive Device (Gait) walker, front-wheeled   Distance in Feet (Required for LE Total Joints) SPT left   Pattern (Gait) step-to    Deviations/Abnormal Patterns (Gait) antalgic;gait speed decreased   Maintains Weight-bearing Status (Gait) able to maintain   Clinical Impression   Criteria for Skilled Therapeutic Intervention Yes, treatment indicated   PT Diagnosis (PT) Impaired functional mobility   Influenced by the following impairments weakness, pain   Functional limitations due to impairments Transfers, gait, bed mobility   Clinical Presentation (PT Evaluation Complexity) Evolving/Changing   Clinical Presentation Rationale Pt presents as medically diagnosed   Clinical Decision Making (Complexity) moderate complexity   Planned Therapy Interventions (PT) gait training;home exercise program;transfer training;strengthening;patient/family education;bed mobility training   Anticipated Equipment Needs at Discharge (PT) walker, rolling   Risk & Benefits of therapy have been explained evaluation/treatment results reviewed;care plan/treatment goals reviewed;patient   PT Discharge Planning   PT Discharge Recommendation (DC Rec) Transitional Care Facility   Physical Therapy Goals   PT Frequency 2x/day   PT Predicated Duration/Target Date for Goal Attainment 03/15/22   PT Goals Transfers;Gait;PT Goal 1   PT: Transfers Sit to/from stand;Assistive device;Minimal assist   PT: Gait Minimal assist;25 feet;Rolling walker   PT: Goal 1 SBA with TH HEP with handout

## 2022-03-09 ENCOUNTER — APPOINTMENT (OUTPATIENT)
Dept: OCCUPATIONAL THERAPY | Facility: CLINIC | Age: 77
DRG: 481 | End: 2022-03-09
Payer: COMMERCIAL

## 2022-03-09 ENCOUNTER — APPOINTMENT (OUTPATIENT)
Dept: PHYSICAL THERAPY | Facility: CLINIC | Age: 77
DRG: 481 | End: 2022-03-09
Payer: COMMERCIAL

## 2022-03-09 VITALS
DIASTOLIC BLOOD PRESSURE: 68 MMHG | TEMPERATURE: 98.5 F | OXYGEN SATURATION: 93 % | WEIGHT: 128 LBS | BODY MASS INDEX: 24.17 KG/M2 | HEIGHT: 61 IN | RESPIRATION RATE: 16 BRPM | HEART RATE: 68 BPM | SYSTOLIC BLOOD PRESSURE: 147 MMHG

## 2022-03-09 LAB
GLUCOSE BLD-MCNC: 108 MG/DL (ref 70–125)
HGB BLD-MCNC: 11.2 G/DL (ref 11.7–15.7)

## 2022-03-09 PROCEDURE — 250N000013 HC RX MED GY IP 250 OP 250 PS 637: Performed by: ORTHOPAEDIC SURGERY

## 2022-03-09 PROCEDURE — 36415 COLL VENOUS BLD VENIPUNCTURE: CPT | Performed by: ORTHOPAEDIC SURGERY

## 2022-03-09 PROCEDURE — 36415 COLL VENOUS BLD VENIPUNCTURE: CPT | Performed by: HOSPITALIST

## 2022-03-09 PROCEDURE — 250N000013 HC RX MED GY IP 250 OP 250 PS 637: Performed by: HOSPITALIST

## 2022-03-09 PROCEDURE — 97116 GAIT TRAINING THERAPY: CPT | Mod: GP

## 2022-03-09 PROCEDURE — 97110 THERAPEUTIC EXERCISES: CPT | Mod: GP

## 2022-03-09 PROCEDURE — 250N000011 HC RX IP 250 OP 636: Performed by: ORTHOPAEDIC SURGERY

## 2022-03-09 PROCEDURE — 250N000013 HC RX MED GY IP 250 OP 250 PS 637: Performed by: STUDENT IN AN ORGANIZED HEALTH CARE EDUCATION/TRAINING PROGRAM

## 2022-03-09 PROCEDURE — 82947 ASSAY GLUCOSE BLOOD QUANT: CPT | Performed by: HOSPITALIST

## 2022-03-09 PROCEDURE — 85018 HEMOGLOBIN: CPT | Performed by: ORTHOPAEDIC SURGERY

## 2022-03-09 PROCEDURE — 99239 HOSP IP/OBS DSCHRG MGMT >30: CPT | Performed by: HOSPITALIST

## 2022-03-09 RX ORDER — TRAMADOL HYDROCHLORIDE 50 MG/1
50-100 TABLET ORAL EVERY 6 HOURS PRN
Qty: 30 TABLET | Refills: 0 | Status: SHIPPED | OUTPATIENT
Start: 2022-03-09 | End: 2022-03-14

## 2022-03-09 RX ORDER — AMLODIPINE BESYLATE 5 MG/1
5 TABLET ORAL DAILY
Status: DISCONTINUED | OUTPATIENT
Start: 2022-03-09 | End: 2022-03-09 | Stop reason: HOSPADM

## 2022-03-09 RX ORDER — ACETAMINOPHEN 325 MG/1
650 TABLET ORAL EVERY 6 HOURS PRN
Start: 2022-03-09 | End: 2022-03-17

## 2022-03-09 RX ORDER — TRAMADOL HYDROCHLORIDE 50 MG/1
25 TABLET ORAL EVERY 6 HOURS PRN
Qty: 10 TABLET | Refills: 0 | Status: SHIPPED | OUTPATIENT
Start: 2022-03-09 | End: 2022-03-09

## 2022-03-09 RX ORDER — AMLODIPINE BESYLATE 5 MG/1
5 TABLET ORAL DAILY
Start: 2022-03-10

## 2022-03-09 RX ADMIN — ACETAMINOPHEN 975 MG: 325 TABLET ORAL at 13:28

## 2022-03-09 RX ADMIN — ENOXAPARIN SODIUM 30 MG: 30 INJECTION SUBCUTANEOUS at 04:13

## 2022-03-09 RX ADMIN — TRAMADOL HYDROCHLORIDE 25 MG: 50 TABLET, FILM COATED ORAL at 06:26

## 2022-03-09 RX ADMIN — AMLODIPINE BESYLATE 5 MG: 5 TABLET ORAL at 09:11

## 2022-03-09 RX ADMIN — POLYETHYLENE GLYCOL 3350 17 G: 17 POWDER, FOR SOLUTION ORAL at 09:11

## 2022-03-09 RX ADMIN — ACETAMINOPHEN 975 MG: 325 TABLET ORAL at 06:24

## 2022-03-09 RX ADMIN — ACETAMINOPHEN 650 MG: 325 TABLET ORAL at 00:20

## 2022-03-09 RX ADMIN — SENNOSIDES AND DOCUSATE SODIUM 1 TABLET: 50; 8.6 TABLET ORAL at 09:11

## 2022-03-09 RX ADMIN — Medication 1 MG: at 00:26

## 2022-03-09 ASSESSMENT — ACTIVITIES OF DAILY LIVING (ADL)
ADLS_ACUITY_SCORE: 15

## 2022-03-09 NOTE — DISCHARGE SUMMARY
[facility] MEDICINE  DISCHARGE SUMMARY     Primary Care Physician: Gissel Hollis  Admission Date: 3/7/2022   Discharge Provider: Deidre Calderón MD Discharge Date: 3/9/2022   Diet:   Active Diet and Nourishment Order   Procedures     Advance Diet as Tolerated: Regular Diet Adult     Diet       Code Status: Full Code   Activity: DCACTIVITY: Activity as tolerated        Condition at Discharge: Stable     REASON FOR PRESENTATION(See Admission Note for Details)     Left hip pain after fall  PRINCIPAL & ACTIVE DISCHARGE DIAGNOSES     Active Problems:    Closed fracture of left hip, initial encounter (H)    Hip fracture, left, closed, initial encounter (H)      PENDING LABS     Unresulted Labs Ordered in the Past 30 Days of this Admission     No orders found from 2/5/2022 to 3/8/2022.            PROCEDURES ( this hospitalization only)      Procedure(s):  INTERNAL FIXATION, FRACTURE, TROCHANTERIC, HIP, USING PINS OR RODS, USING HANA TABLE-LEFT    RECOMMENDATIONS TO OUTPATIENT PROVIDER FOR F/U VISIT     Follow-up Appointments     Follow Up and recommended labs and tests      Follow up with long term physician.  The following labs/tests are   recommended: hemoglobin check in 3 days .             Follow-up with orthopedic surgery    Monitor blood pressure on amlodipine adjust the dose if necessary    DISPOSITION     Skilled Nursing Facility    SUMMARY OF HOSPITAL COURSE:      77-year-old patient without significant past medical history, active smoker, she sustained a mechanical fall and due to slipping on ice and the complained of pain in her hip, presented to the ER for further evaluation, further work-up including x-ray of the pelvis showed moderately displaced intertrochanteric fracture of the left femur, orthopedic surgery consulted for operative management    Displaced left hip femoral fracture due to fall  Orthopedic surgery consulted and underwent internal fixation of the  left hip with the pain on Reeves  Postoperatively was started on Lovenox 30 daily in addition to SCD,weightbearing as tolerated, PT OT working with patient,  -Transitional care discharge today in stable condition      Elevated blood pressure reading  She was noted to have elevated blood pressure while inpatient was started on amlodipine, continue upon discharge for better control of her blood pressure and recommended to monitor and adjust the dose if necessary      Postoperative blood loss anemia  Hemoglobin dropped to 11.2 from 15.2 likely secondary to intraoperative blood loss,  Recommended to recheck hemoglobin after discharge to make sure it is stable and no further drop,  Discharge Medications with Med changes:     Current Discharge Medication List      START taking these medications    Details   acetaminophen (TYLENOL) 325 MG tablet Take 2 tablets (650 mg) by mouth every 6 hours as needed for mild pain or other (and adjunct with moderate or severe pain or per patient request)    Associated Diagnoses: Closed fracture of left hip, initial encounter (H); Hip fracture, left, closed, initial encounter (H)      amLODIPine (NORVASC) 5 MG tablet Take 1 tablet (5 mg) by mouth daily    Associated Diagnoses: Benign essential hypertension      traMADol (ULTRAM) 50 MG tablet Take 0.5 tablets (25 mg) by mouth every 6 hours as needed for moderate pain or severe pain  Qty: 10 tablet, Refills: 0    Associated Diagnoses: Closed fracture of left hip, initial encounter (H); Hip fracture, left, closed, initial encounter (H)         CONTINUE these medications which have NOT CHANGED    Details   cyanocobalamin (VITAMIN B-12) 1000 MCG tablet Take 1,000 mcg by mouth daily      Vitamin D, Cholecalciferol, 25 MCG (1000 UT) TABS Take 1 tablet by mouth daily                   Rationale for medication changes:    Tramadol for pain control,  Lovenox for DVT prophylaxis        Consults       SOCIAL WORK IP CONSULT  PHYSICAL THERAPY ADULT IP  CONSULT  OCCUPATIONAL THERAPY ADULT IP CONSULT  PHYSICAL THERAPY ADULT IP CONSULT  OCCUPATIONAL THERAPY ADULT IP CONSULT    Immunizations given this encounter     Most Recent Immunizations   Administered Date(s) Administered     COVID-19,PF,Pfizer (12+ Yrs) 03/24/2021     Influenza (High Dose) 3 valent vaccine 10/17/2019     Pneumo Conj 13-V (2010&after) 04/29/2019     Pneumococcal 23 valent 02/13/2012     Td,adult,historic,unspecified 02/13/2012           Anticoagulation Information      Lovenox for DVT prophylaxis      SIGNIFICANT IMAGING FINDINGS     Results for orders placed or performed during the hospital encounter of 03/07/22   XR Pelvis and Hip Left 2 Views    Impression    IMPRESSION: Acute comminuted moderately displaced intertrochanteric fracture of the left femur with resulting varus deformity. No additional fractures. No degenerative changes.   XR Chest 1 View    Impression    IMPRESSION: The lungs are clear. The heart size and pulmonary vascularity are normal. There is no pneumothorax or pleural effusion.       SIGNIFICANT LABORATORY FINDINGS     Most Recent 3 Hemoglobins:Recent Labs   Lab Test 03/09/22  0628 03/08/22  0605 03/07/22  0931   HGB 11.2* 13.0 15.2           Discharge Orders        General info for SNF    Length of Stay Estimate: Short Term Care: Estimated # of Days <30  Condition at Discharge: Stable  Level of care:skilled   Rehabilitation Potential: Good  Admission H&P remains valid and up-to-date: Yes  Recent Chemotherapy: N/A  Use Nursing Home Standing Orders: Yes     Mantoux instructions    Give two-step Mantoux (PPD) Per Facility Policy Yes     Follow Up and recommended labs and tests    Follow up with [facility] physician.  The following labs/tests are recommended: hemoglobin check in 3 days .     Reason for your hospital stay    Left femur fracture     Activity - Up with nursing assistance     Full Code     Physical Therapy Adult Consult    Evaluate and treat as clinically  indicated.    Postoperative hip surgery     Occupational Therapy Adult Consult    Evaluate and treat as clinically indicated.    Postoperative hip surgery     Fall precautions     Crutches DME    DME Documentation: Describe the reason for need to support medical necessity: Impaired gait status post hip surgery. I, the undersigned, certify that the above prescribed supplies are medically necessary for this patient and is both reasonable and necessary in reference to accepted standards of medical practice in the treatment of this patient's condition and is not prescribed as a convenience.     Cane DME    DME Documentation: Describe the reason for need to support medical necessity: Impaired gait status post hip surgery. I, the undersigned, certify that the above prescribed supplies are medically necessary for this patient and is both reasonable and necessary in reference to accepted standards of medical practice in the treatment of this patient's condition and is not prescribed as a convenience.     Walker DME    : DME Documentation: Describe the reason for need to support medical necessity: Impaired gait status post hip surgery. I, the undersigned, certify that the above prescribed supplies are medically necessary for this patient and is both reasonable and necessary in reference to accepted standards of medical practice in the treatment of this patient's condition and is not prescribed as a convenience.     Diet    Follow this diet upon discharge: Orders Placed This Encounter      Advance Diet as Tolerated: Regular Diet Adult       Examination   Physical Exam   Temp:  [98.1  F (36.7  C)-98.7  F (37.1  C)] 98.5  F (36.9  C)  Pulse:  [68-77] 68  Resp:  [15-18] 16  BP: (147-180)/(67-74) 147/68  SpO2:  [93 %-95 %] 93 %  Wt Readings from Last 1 Encounters:   03/07/22 58.1 kg (128 lb)         General Appearance: Alert, oriented x3, does not appear in distress.  HEENT: Normocephalic. No scleral icterus, . Mucous membranes  moist.  Heart: :Regular rate and rhythm, normal S1 ,S2, No murmurs, no JVD, no pedal edema   Lungs: Clear to auscultation bilaterally. No wheezing or crackles  Abdomen: Soft, non tender, no rebound or rigidity, non distended, bowel sounds present.  Extremity: Range of motion of the left lower extremity secondary to pain    Please see EMR for more detailed significant labs, imaging, consultant notes etc.    I, Deidre Calderón MD, personally saw the patient today and spent greater than 30 minutes discharging this patient.    Deidre Calderón MD  [facility]    CC:Gissel Hollis

## 2022-03-09 NOTE — PLAN OF CARE
Pt voided x1 and was ambulated in the room. She needed lots of encouragement and guidance for ambulation and remains assist x2 with gaitbelt and walker. She is resting in bed now in no acute distress. VSS.     Goal Outcome Evaluation:          Problem: Plan of Care - These are the overarching goals to be used throughout the patient stay.    Goal: Optimal Comfort and Wellbeing  Outcome: Ongoing, Progressing     Problem: Adjustment to Injury (Hip Fracture Medical Management)  Goal: Optimal Coping with Change in Health Status  Outcome: Ongoing, Progressing

## 2022-03-09 NOTE — PROGRESS NOTES
"Orthopedic Progress Note      Assessment: 2 Days Post-Op  S/P Procedure(s):  INTERNAL FIXATION, FRACTURE, TROCHANTERIC, HIP, USING PINS OR RODS, USING HANA TABLE-LEFT     Plan:   - Continue PT/OT  - Weightbearing status: WBAT  - Anticoagulation: lovenox in addition to SCDs, martin stockings and early ambulation.  - dressing change today.   - Discharge planning: TCU today, okay to discharge. followup in 2 weeks with Dr. Hicks       Subjective:  Pain: 3-4/10, mild pain at rest.   Neuro:  Patient denies new onset numbness or paresthesias    Patient reports she is doing okay, pain could be worse. She states she is doing better than yesterday, pain at rest is mild, and with weight bearing it increases. Feels slow progression, is okay with discharging to TCU today.     Objective:  BP (!) 147/68 (BP Location: Right arm)   Pulse 68   Temp 98.5  F (36.9  C) (Oral)   Resp 16   Ht 1.549 m (5' 1\")   Wt 58.1 kg (128 lb)   SpO2 93%   BMI 24.19 kg/m    The patient is A&Ox3. Appears comfortable.   Sensation is intact BLEs.   Dorsiflexion and plantar flexion is intact.  Dorsalis pedis pulse intact.  Calves are soft and non-tender.   The incision is healing well, no active drainage, sutures in place. Bandage changed as it was 76% saturated. Now incision is covered. Dressing C/D/I.      Pertinent Labs   Lab Results: personally reviewed.   No results found for: INR, PROTIME  Lab Results   Component Value Date    WBC 10.5 03/07/2022    HGB 11.2 (L) 03/09/2022    HCT 44.2 03/07/2022    MCV 95 03/07/2022     03/07/2022     Lab Results   Component Value Date     03/07/2022    CO2 19 (L) 03/07/2022         Report completed by:  Jennifer Rene PA-C  Date: 3/9/2022  Time: 4:45 PM    "

## 2022-03-09 NOTE — PROGRESS NOTES
Care Management Follow Up    Length of Stay (days): 2    Expected Discharge Date: 03/09/2022     Concerns to be Addressed: TCU placement        Patient plan of care discussed at interdisciplinary rounds: Yes    Anticipated Discharge Disposition: [facility] TCU      Anticipated Discharge Services:  TCU    Anticipated Discharge DME:  none    Patient/family educated on Medicare website which has current facility and service quality ratings: yes      Education Provided on the Discharge Plan:  CM met with patient. AVS per bedside.     Patient/Family in Agreement with the Plan:  yes       Private pay costs discussed: transportation costs    Additional Information:  Chart reviewed. CM met with patient. Patient is in agreement with going to [facility] TCU at 2:45pm. CM attempted to update daughter Tatiana but had to leave a VM. PAS ([id number]) completed and given to [facility].   CM arranged for HE  transportation. CM coordinated with MD, charge RN, bedside RN and [facility].     CM completed PAS- [id number] 11:33AM  CM left VM for gayatri Simpson regarding discharge plan 11:22 AM      CM confirmed acceptance with facility and updated regarding ride tie of 2:45pm.     HE  transportation arranged for 2:45pm today.     PT recommendations: Transitional Care Facility  OT recommendations: Transitional Care Facility     Accepted at [facility]. 7:42AM      Priya Figueroa RN

## 2022-03-09 NOTE — PLAN OF CARE
Patient vital signs are at baseline: Yes except /67  Patient able to ambulate as they were prior to admission or with assist devices provided by therapies during their stay:  Yes  Patient MUST void prior to discharge:  Yes  Patient able to tolerate oral intake:  Yes  Pain has adequate pain control using Oral analgesics:  Yes  Does patient have an identified :  Yes  Has goal D/C date and time been discussed with patient:  Yes  Goal Outcome Evaluation:    Pt is A &  O X 4. Reporting pain 3/10. Increased with activity. Able to express her needs. Ambulated to the bathroom with assist of one. Pain is managed with scheduled Tylenol and PRN Tramadol. Did not want to get up early in the morning. Needed encouragement to go to bathroom.

## 2022-03-09 NOTE — PROGRESS NOTES
Patient vital signs are at baseline: Yes  Patient able to ambulate as they were prior to admission or with assist devices provided by therapies during their stay:  No,  Reason:  astx1  Patient MUST void prior to discharge:  Yes  Patient able to tolerate oral intake:  Yes  Pain has adequate pain control using Oral analgesics:  Yes  Does patient have an identified :  Yes  Has goal D/C date and time been discussed with patient:  Yes     Pt taking oral tramadol and prn tylenol for pain. Educated on I.S. able to perform. CMS intact to BLE. aquacel dressing in place to L hip. Able to ambulate. Expected to discharge later today.

## 2022-03-09 NOTE — PLAN OF CARE
Physical Therapy Discharge Summary    Reason for therapy discharge:    Discharged to transitional care facility.    Progress towards therapy goal(s). See goals on Care Plan in [facility] electronic health record for goal details.  Goals partially met.  Barriers to achieving goals:   discharge from facility and pain and weakness.    Therapy recommendation(s):    Continued therapy is recommended.  Rationale/Recommendations:  TCU.

## 2022-03-09 NOTE — PLAN OF CARE
Occupational Therapy Discharge Summary    Reason for therapy discharge:    Discharged to transitional care facility.    Progress towards therapy goal(s). See goals on Care Plan in [facility] electronic health record for goal details.  Goals not met.  Barriers to achieving goals:   discharge from facility.    Therapy recommendation(s):    Continued therapy is recommended.  Rationale/Recommendations:  at TCU to increase independence with ADLS.

## 2022-03-09 NOTE — DISCHARGE INSTRUCTIONS
[facility]  [address] [place] [address]   Phone #  [phone]  [facility]  [place] [facility]  Call # [phone] for [facility]

## 2022-03-10 ENCOUNTER — TRANSITIONAL CARE UNIT VISIT (OUTPATIENT)
Dept: GERIATRICS | Facility: CLINIC | Age: 77
End: 2022-03-10
Payer: COMMERCIAL

## 2022-03-10 ENCOUNTER — PATIENT OUTREACH (OUTPATIENT)
Dept: CARE COORDINATION | Facility: CLINIC | Age: 77
End: 2022-03-10

## 2022-03-10 ENCOUNTER — LAB REQUISITION (OUTPATIENT)
Dept: LAB | Facility: CLINIC | Age: 77
End: 2022-03-10

## 2022-03-10 VITALS
TEMPERATURE: 97 F | HEIGHT: 61 IN | WEIGHT: 128 LBS | HEART RATE: 78 BPM | SYSTOLIC BLOOD PRESSURE: 133 MMHG | OXYGEN SATURATION: 97 % | DIASTOLIC BLOOD PRESSURE: 79 MMHG | RESPIRATION RATE: 17 BRPM | BODY MASS INDEX: 24.17 KG/M2

## 2022-03-10 DIAGNOSIS — G47.9 SLEEP DISTURBANCE: ICD-10-CM

## 2022-03-10 DIAGNOSIS — Z98.890 S/P ORIF (OPEN REDUCTION INTERNAL FIXATION) FRACTURE: Primary | ICD-10-CM

## 2022-03-10 DIAGNOSIS — D62 ABLA (ACUTE BLOOD LOSS ANEMIA): ICD-10-CM

## 2022-03-10 DIAGNOSIS — Z71.89 OTHER SPECIFIED COUNSELING: ICD-10-CM

## 2022-03-10 DIAGNOSIS — I10 PRIMARY HYPERTENSION: ICD-10-CM

## 2022-03-10 DIAGNOSIS — Z87.81 S/P ORIF (OPEN REDUCTION INTERNAL FIXATION) FRACTURE: Primary | ICD-10-CM

## 2022-03-10 DIAGNOSIS — R39.15 URINARY URGENCY: ICD-10-CM

## 2022-03-10 LAB
ALBUMIN UR-MCNC: 10 MG/DL
AMORPH CRY #/AREA URNS HPF: ABNORMAL /HPF
APPEARANCE UR: CLEAR
BILIRUB UR QL STRIP: NEGATIVE
COLOR UR AUTO: YELLOW
GLUCOSE UR STRIP-MCNC: NEGATIVE MG/DL
HGB UR QL STRIP: ABNORMAL
KETONES UR STRIP-MCNC: 20 MG/DL
LEUKOCYTE ESTERASE UR QL STRIP: NEGATIVE
MUCOUS THREADS #/AREA URNS LPF: PRESENT /LPF
NITRATE UR QL: NEGATIVE
PH UR STRIP: 6 [PH] (ref 5–7)
RBC URINE: 7 /HPF
SP GR UR STRIP: 1.02 (ref 1–1.03)
SQUAMOUS EPITHELIAL: <1 /HPF
UROBILINOGEN UR STRIP-MCNC: 2 MG/DL
WBC URINE: 2 /HPF

## 2022-03-10 PROCEDURE — 81003 URINALYSIS AUTO W/O SCOPE: CPT | Performed by: FAMILY MEDICINE

## 2022-03-10 PROCEDURE — 99309 SBSQ NF CARE MODERATE MDM 30: CPT | Performed by: NURSE PRACTITIONER

## 2022-03-10 RX ORDER — LANOLIN ALCOHOL/MO/W.PET/CERES
1 CREAM (GRAM) TOPICAL AT BEDTIME
COMMUNITY
Start: 2022-03-10 | End: 2022-03-24

## 2022-03-10 NOTE — LETTER
3/10/2022        RE: Nereyda Vincent  [address]  [facility] [address]        Code Status:  FULL CODE  Visit Type: Hospital F/U     Facility:   [facility] ([facility]) [[id number]]        History of Present Illness:   Hospital Admission Date: 3/7/2022    Hospital Discharge Date: 3/9/2022      Nereyda Vincent is a 77 year old female with past medical history for tobacco dependence.  She was recently hospitalized after a fall in which she sustained a left intertrochanteric femur fracture with mild displacement.  She underwent an ORIF .  Bps were elevated and so she was started on amlodipine.  She has ABLA hgb 15.2 down to 11.2.      Today, she reports pain and has trace right hip edema.  She endorse urinary urgency and reports being up all night due to this and also couldn't fall asleep.      Past Medical History:   Diagnosis Date     Elevated blood pressure 4/26/2016     Past Surgical History:   Procedure Laterality Date     HIP PINNING Left 3/7/2022    Procedure: INTERNAL FIXATION, FRACTURE, TROCHANTERIC, HIP, USING PINS OR RODS, USING HANA TABLE-LEFT;  Surgeon: Luke Hicks MD;  Location: [facility] Main OR     Family History   Problem Relation Age of Onset     Breast Cancer Mother 60.00     Colon Cancer Mother      Dementia Mother      Diabetes Father      Social History     Socioeconomic History     Marital status:      Spouse name: Not on file     Number of children: Not on file     Years of education: Not on file     Highest education level: Not on file   Occupational History     Not on file   Tobacco Use     Smoking status: Current Every Day Smoker     Packs/day: 0.50     Smokeless tobacco: Never Used   Substance and Sexual Activity     Alcohol use: No     Drug use: Never     Sexual activity: Never   Other Topics Concern     Not on file   Social History Narrative     Not on file     Social Determinants of Health     Financial Resource Strain: Not on file   Food Insecurity: Not on  file   Transportation Needs: Not on file   Physical Activity: Not on file   Stress: Not on file   Social Connections: Not on file   Intimate Partner Violence: Not on file   Housing Stability: Not on file       Current Outpatient Medications   Medication Sig Dispense Refill     acetaminophen (TYLENOL) 325 MG tablet Take 2 tablets (650 mg) by mouth every 6 hours as needed for mild pain or other (and adjunct with moderate or severe pain or per patient request)       amLODIPine (NORVASC) 5 MG tablet Take 1 tablet (5 mg) by mouth daily       cyanocobalamin (VITAMIN B-12) 1000 MCG tablet Take 1,000 mcg by mouth daily       enoxaparin ANTICOAGULANT (LOVENOX) 30 MG/0.3ML syringe Inject 0.3 mLs (30 mg) Subcutaneous daily 4.2 mL 0     traMADol (ULTRAM) 50 MG tablet Take 1-2 tablets ( mg) by mouth every 6 hours as needed for severe pain (1 tablet for pain 1-5, 2 tablets for pain 6-10) 30 tablet 0     Vitamin D, Cholecalciferol, 25 MCG (1000 UT) TABS Take 1 tablet by mouth daily       Allergies   Allergen Reactions     Codeine Nausea and Vomiting and Nausea     Immunization History   Administered Date(s) Administered     COVID-19,PF,Pfizer (12+ Yrs) 03/03/2021, 03/24/2021     Influenza (High Dose) 3 valent vaccine 10/17/2019     Pneumo Conj 13-V (2010&after) 04/29/2019     Pneumococcal 23 valent 02/13/2012     TD (ADULT, 7+) 02/13/2012     Td,adult,historic,unspecified 02/13/2012       Medications list and allergies in the facility chart have been reviewed.  Please see facility EMR for most up to date list.       Review of Systems   Patient denies fever, chills, headache, lightheadedness, dizziness, rhinorrhea, cough, congestion, shortness of breath, chest pain, palpitations, abdominal pain, n/v, diarrhea, constipation, change in appetite, dysuria, frequency, burning or pain with urination.  Other than stated in HPI all other review of systems is negative.         Physical Exam   Vital signs:BP (!) 167/89   Pulse 78    "Temp 97  F (36.1  C)   Resp 17   Ht 1.549 m (5' 1\")   Wt 58.1 kg (128 lb)   SpO2 97%   BMI 24.19 kg/m     GENERAL APPEARANCE: Well developed, well nourished, in no acute distress.  HEENT: normocephalic, atraumatic   sclerae anicteric, conjunctivae clear and moist, EOM intact  LUNGS: Lung sounds CTA, no adventitious sounds, respiratory effort normal.  CARD: RRR, S1, S2, without murmurs, gallops, rubs  ABD: Soft, nondistended and nontender with normal bowel sounds.   MSK: Muscle strength and tone were equal bilaterally.  EXTREMITIES: trace RLE  NEURO: Alert and oriented x 3.Face is symmetric.  SKIN: hip incision well approximated with sutures without drainage or s/s of infection.   PSYCH: euthymic            Labs:   Last Comprehensive Metabolic Panel:  Sodium   Date Value Ref Range Status   03/07/2022 139 136 - 145 mmol/L Final     Potassium   Date Value Ref Range Status   03/07/2022 3.9 3.5 - 5.0 mmol/L Final     Chloride   Date Value Ref Range Status   03/07/2022 105 98 - 107 mmol/L Final     Carbon Dioxide (CO2)   Date Value Ref Range Status   03/07/2022 19 (L) 22 - 31 mmol/L Final     Anion Gap   Date Value Ref Range Status   03/07/2022 15 5 - 18 mmol/L Final     Glucose   Date Value Ref Range Status   03/09/2022 108 70 - 125 mg/dL Final     Urea Nitrogen   Date Value Ref Range Status   03/07/2022 12 8 - 28 mg/dL Final     Creatinine   Date Value Ref Range Status   03/07/2022 1.01 0.60 - 1.10 mg/dL Final     GFR Estimate   Date Value Ref Range Status   03/07/2022 58 (L) >60 mL/min/1.73m2 Final     Comment:     Effective December 21, 2021 eGFRcr in adults is calculated using the 2021 CKD-EPI creatinine equation which includes age and gender (Haile louis al., NEJM, DOI: 10.1056/CUCUwz7016187)   05/26/2021 57 (L) >60 mL/min/1.73m2 Final     Calcium   Date Value Ref Range Status   03/07/2022 10.4 8.5 - 10.5 mg/dL Final     Lab Results   Component Value Date    WBC 10.5 03/07/2022     Lab Results   Component Value " Date    RBC 4.65 03/07/2022     Lab Results   Component Value Date    HGB 11.2 03/09/2022     Lab Results   Component Value Date    HCT 44.2 03/07/2022     Lab Results   Component Value Date    MCV 95 03/07/2022     Lab Results   Component Value Date    MCH 32.7 03/07/2022     Lab Results   Component Value Date    MCHC 34.4 03/07/2022     Lab Results   Component Value Date    RDW 13.0 03/07/2022     Lab Results   Component Value Date     03/07/2022           Assessment/plan:   S/P ORIF (open reduction internal fixation) fracture  Follow up with Orthopedics in 2 weeks.  Instructed nursing to follow up with ortho on end date for lovenox for AC.  Pain controlled with apap and tramadol.  WBAT    Primary hypertension  Controlled, continue with amlodipine    Urinary urgency  Check UA/UC    Sleep disturbance  Add melatonin x 14 days.  Counseled patient on circadian rhythms.       Electronically signed by: Anabel Alfaro NP          Sincerely,        Anabel Alfaro NP

## 2022-03-10 NOTE — PROGRESS NOTES
Code Status:  FULL CODE  Visit Type: Hospital F/U     Facility:   [facility] ([facility]) [[id number]]        History of Present Illness:   Hospital Admission Date: 3/7/2022    Hospital Discharge Date: 3/9/2022      Nereyda Vincent is a 77 year old female with past medical history for tobacco dependence.  She was recently hospitalized after a fall in which she sustained a left intertrochanteric femur fracture with mild displacement.  She underwent an ORIF .  Bps were elevated and so she was started on amlodipine.  She has ABLA hgb 15.2 down to 11.2.      Today, she reports pain and has trace right hip edema.  She endorse urinary urgency and reports being up all night due to this and also couldn't fall asleep.      Past Medical History:   Diagnosis Date     Elevated blood pressure 4/26/2016     Past Surgical History:   Procedure Laterality Date     HIP PINNING Left 3/7/2022    Procedure: INTERNAL FIXATION, FRACTURE, TROCHANTERIC, HIP, USING PINS OR RODS, USING HANA TABLE-LEFT;  Surgeon: Luke Hicks MD;  Location: [facility] Main OR     Family History   Problem Relation Age of Onset     Breast Cancer Mother 60.00     Colon Cancer Mother      Dementia Mother      Diabetes Father      Social History     Socioeconomic History     Marital status:      Spouse name: Not on file     Number of children: Not on file     Years of education: Not on file     Highest education level: Not on file   Occupational History     Not on file   Tobacco Use     Smoking status: Current Every Day Smoker     Packs/day: 0.50     Smokeless tobacco: Never Used   Substance and Sexual Activity     Alcohol use: No     Drug use: Never     Sexual activity: Never   Other Topics Concern     Not on file   Social History Narrative     Not on file     Social Determinants of Health     Financial Resource Strain: Not on file   Food Insecurity: Not on file   Transportation Needs: Not on file   Physical Activity: Not on file   Stress: Not on  "file   Social Connections: Not on file   Intimate Partner Violence: Not on file   Housing Stability: Not on file       Current Outpatient Medications   Medication Sig Dispense Refill     acetaminophen (TYLENOL) 325 MG tablet Take 2 tablets (650 mg) by mouth every 6 hours as needed for mild pain or other (and adjunct with moderate or severe pain or per patient request)       amLODIPine (NORVASC) 5 MG tablet Take 1 tablet (5 mg) by mouth daily       cyanocobalamin (VITAMIN B-12) 1000 MCG tablet Take 1,000 mcg by mouth daily       enoxaparin ANTICOAGULANT (LOVENOX) 30 MG/0.3ML syringe Inject 0.3 mLs (30 mg) Subcutaneous daily 4.2 mL 0     traMADol (ULTRAM) 50 MG tablet Take 1-2 tablets ( mg) by mouth every 6 hours as needed for severe pain (1 tablet for pain 1-5, 2 tablets for pain 6-10) 30 tablet 0     Vitamin D, Cholecalciferol, 25 MCG (1000 UT) TABS Take 1 tablet by mouth daily       Allergies   Allergen Reactions     Codeine Nausea and Vomiting and Nausea     Immunization History   Administered Date(s) Administered     COVID-19,PF,Pfizer (12+ Yrs) 03/03/2021, 03/24/2021     Influenza (High Dose) 3 valent vaccine 10/17/2019     Pneumo Conj 13-V (2010&after) 04/29/2019     Pneumococcal 23 valent 02/13/2012     TD (ADULT, 7+) 02/13/2012     Td,adult,historic,unspecified 02/13/2012       Medications list and allergies in the facility chart have been reviewed.  Please see facility EMR for most up to date list.       Review of Systems   Patient denies fever, chills, headache, lightheadedness, dizziness, rhinorrhea, cough, congestion, shortness of breath, chest pain, palpitations, abdominal pain, n/v, diarrhea, constipation, change in appetite, dysuria, frequency, burning or pain with urination.  Other than stated in HPI all other review of systems is negative.         Physical Exam   Vital signs:BP (!) 167/89   Pulse 78   Temp 97  F (36.1  C)   Resp 17   Ht 1.549 m (5' 1\")   Wt 58.1 kg (128 lb)   SpO2 97%   " BMI 24.19 kg/m     GENERAL APPEARANCE: Well developed, well nourished, in no acute distress.  HEENT: normocephalic, atraumatic   sclerae anicteric, conjunctivae clear and moist, EOM intact  LUNGS: Lung sounds CTA, no adventitious sounds, respiratory effort normal.  CARD: RRR, S1, S2, without murmurs, gallops, rubs  ABD: Soft, nondistended and nontender with normal bowel sounds.   MSK: Muscle strength and tone were equal bilaterally.  EXTREMITIES: trace RLE  NEURO: Alert and oriented x 3.Face is symmetric.  SKIN: hip incision well approximated with sutures without drainage or s/s of infection.   PSYCH: euthymic            Labs:   Last Comprehensive Metabolic Panel:  Sodium   Date Value Ref Range Status   03/07/2022 139 136 - 145 mmol/L Final     Potassium   Date Value Ref Range Status   03/07/2022 3.9 3.5 - 5.0 mmol/L Final     Chloride   Date Value Ref Range Status   03/07/2022 105 98 - 107 mmol/L Final     Carbon Dioxide (CO2)   Date Value Ref Range Status   03/07/2022 19 (L) 22 - 31 mmol/L Final     Anion Gap   Date Value Ref Range Status   03/07/2022 15 5 - 18 mmol/L Final     Glucose   Date Value Ref Range Status   03/09/2022 108 70 - 125 mg/dL Final     Urea Nitrogen   Date Value Ref Range Status   03/07/2022 12 8 - 28 mg/dL Final     Creatinine   Date Value Ref Range Status   03/07/2022 1.01 0.60 - 1.10 mg/dL Final     GFR Estimate   Date Value Ref Range Status   03/07/2022 58 (L) >60 mL/min/1.73m2 Final     Comment:     Effective December 21, 2021 eGFRcr in adults is calculated using the 2021 CKD-EPI creatinine equation which includes age and gender (Haile et al., NEJ, DOI: 10.1056/WAPVto5202023)   05/26/2021 57 (L) >60 mL/min/1.73m2 Final     Calcium   Date Value Ref Range Status   03/07/2022 10.4 8.5 - 10.5 mg/dL Final     Lab Results   Component Value Date    WBC 10.5 03/07/2022     Lab Results   Component Value Date    RBC 4.65 03/07/2022     Lab Results   Component Value Date    HGB 11.2 03/09/2022      Lab Results   Component Value Date    HCT 44.2 03/07/2022     Lab Results   Component Value Date    MCV 95 03/07/2022     Lab Results   Component Value Date    MCH 32.7 03/07/2022     Lab Results   Component Value Date    MCHC 34.4 03/07/2022     Lab Results   Component Value Date    RDW 13.0 03/07/2022     Lab Results   Component Value Date     03/07/2022           Assessment/plan:   S/P ORIF (open reduction internal fixation) fracture  Follow up with Orthopedics in 2 weeks.  Instructed nursing to follow up with ortho on end date for lovenox for AC.  Pain controlled with apap and tramadol.  WBAT    Primary hypertension  Controlled, continue with amlodipine    Urinary urgency  Check UA/UC    Sleep disturbance  Add melatonin x 14 days.  Counseled patient on circadian rhythms.       Electronically signed by: Anabel Alfaro NP

## 2022-03-10 NOTE — PROGRESS NOTES
Clinic Care Coordination Contact    Background: Care Coordination referral placed from [facility] discharge report for reason of patient meeting criteria for a TCM outreach call by [facility] team.    Assessment: Upon chart review, [facility] Team member will cancel/close the referral for TCM outreach due to reason below:    Patient is not established within [facility]. Upon chart review, [facility] Team member noted patient discharged to TCU.    Plan: Care Coordination referral for TCM outreach canceled.    QUEENIE Verdugo  [facility] [place], [facility]

## 2022-03-11 ENCOUNTER — LAB REQUISITION (OUTPATIENT)
Dept: LAB | Facility: CLINIC | Age: 77
End: 2022-03-11

## 2022-03-11 DIAGNOSIS — U07.1 COVID-19: ICD-10-CM

## 2022-03-11 PROCEDURE — U0003 INFECTIOUS AGENT DETECTION BY NUCLEIC ACID (DNA OR RNA); SEVERE ACUTE RESPIRATORY SYNDROME CORONAVIRUS 2 (SARS-COV-2) (CORONAVIRUS DISEASE [COVID-19]), AMPLIFIED PROBE TECHNIQUE, MAKING USE OF HIGH THROUGHPUT TECHNOLOGIES AS DESCRIBED BY CMS-2020-01-R: HCPCS | Performed by: FAMILY MEDICINE

## 2022-03-12 LAB — SARS-COV-2 RNA RESP QL NAA+PROBE: NEGATIVE

## 2022-03-13 PROBLEM — N18.31 CHRONIC KIDNEY DISEASE, STAGE 3A (H): Status: ACTIVE | Noted: 2022-03-13

## 2022-03-13 NOTE — PROGRESS NOTES
[facility] GERIATRIC SERVICES       Patient Nereyda Vincent  MRN: [id number]        Reason for Visit     Chief Complaint   Patient presents with     RECHECK       Code Status     CPR/Full code     Assessment     l femur fracture s/p ORIF  History of a mechanical fall  HTN  ABLA  Generalized weakness  constipation    Plan     Pt is admitted to TCU for strengthening and rehab.  Pt admitted to TCU after elective hip surgery  Date of procedure-   Continue with incisional cares  WBAT  Follow-up with orthopedics as scheduled  Cont PT / OT for strengthening/ gait retraining  Pain management optimized  Reports poor pain coontrol  Tylenol scheduled 1 g every 8 hours  Continue narcotics prn-on tramadol as needed; REFILL GIVEN  Advised aggressive icing  On lovenox  for dvt prophylaxis  Duration to be determined by orthopedics  Tubigrip stockings recommended for management of swelling of the lower extremities     In addition she is now on amlodipine for management of her blood pressure  Recheck labs; ua done was neg  Melatonin 3mg po qhs  Continue with PT/OT-remains very weak and bedbound  Also reporting significant constipation with no BM reported since admission.  Senna added to her regimen.  Nursing advised to administer her suppository if no BM is reported    History     Patient is a very pleasant 77 year old female who is admitted to TCU  Patient presented after a mechanical fall she apparently slipped on ice.  She was noted to have displaced left hip femur fracture.  Orthopedics was consulted.  She underwent surgical repair of her hip fracture.  She is discharged weightbearing as tolerated  Postoperatively course complicated by elevated blood pressures.  She was started on amlodipine.  Her blood pressures will require monitoring.  She also had some blood loss anemia but did not require a blood transfusion.  Pain management optimized she is on Tylenol with tramadol.    Past Medical & Surgical History     PAST MEDICAL  HISTORY:   Past Medical History:   Diagnosis Date     Elevated blood pressure 4/26/2016      PAST SURGICAL HISTORY:   has a past surgical history that includes Hip Pinning (Left, 3/7/2022).      Past Social History     Reviewed,  reports that she has been smoking. She has been smoking about 0.50 packs per day. She has never used smokeless tobacco. She reports that she does not drink alcohol and does not use drugs.    Family History     Reviewed, and family history includes Breast Cancer (age of onset: 60.00) in her mother; Colon Cancer in her mother; Dementia in her mother; Diabetes in her father.    Medication List   Post Discharge Medication Reconciliation Status: Post Discharge Medication Reconciliation Status: discharge medications reconciled, continue medications without change.  Current Outpatient Medications   Medication     acetaminophen (TYLENOL) 325 MG tablet     amLODIPine (NORVASC) 5 MG tablet     cyanocobalamin (VITAMIN B-12) 1000 MCG tablet     enoxaparin ANTICOAGULANT (LOVENOX) 30 MG/0.3ML syringe     melatonin 3 MG tablet     traMADol (ULTRAM) 50 MG tablet     Vitamin D, Cholecalciferol, 25 MCG (1000 UT) TABS     No current facility-administered medications for this visit.          Allergies     Allergies   Allergen Reactions     Codeine Nausea and Vomiting and Nausea       Review of Systems   A comprehensive review of 14 systems was done. Pertinent findings noted here and in history of present illness. All the rest negative.  Constitutional: Negative.  Negative for fever, chills, she has  activity change, appetite change and fatigue.   HENT: Negative for congestion and facial swelling.    Eyes: Negative for photophobia, redness and visual disturbance.   Respiratory: Negative for cough and chest tightness.    Cardiovascular: Negative for chest pain, palpitations and leg swelling.   Gastrointestinal: Negative for nausea, diarrhea, having significant constipation, patient reports no BM since admission  "to the TCU, no blood in stool and abdominal distention.   Genitourinary: Negative.    Musculoskeletal: Reporting poor pain control  Skin: Negative.    Neurological: Negative for dizziness, tremors, syncope, weakness, light-headedness and headaches.   Hematological: Does not bruise/bleed easily.   Psychiatric/Behavioral: Negative.  Anxious about pain management and reports her pain is not controlled well  Melatonin added to her regimen since patient was complaining she is not sleeping well      Physical Exam   /79   Pulse 88   Temp 98.8  F (37.1  C)   Resp 17   Ht 1.549 m (5' 1\")   Wt 59.4 kg (131 lb)   SpO2 99%   BMI 24.75 kg/m       Constitutional: Oriented to person, place, and time and appears well-developed.   HEENT:  Normocephalic and atraumatic.  Eyes: Conjunctivae and EOM are normal. Pupils are equal, round, and reactive to light. No discharge.  No scleral icterus. Nose normal. Mouth/Throat: Oropharynx is clear and moist. No oropharyngeal exudate.    NECK: Normal range of motion. Neck supple. No JVD present. No tracheal deviation present. No thyromegaly present.   CARDIOVASCULAR: Normal rate, regular rhythm and intact distal pulses.  Exam reveals no gallop and no friction rub.  Systolic murmur present.  PULMONARY: Effort normal and breath sounds normal. No respiratory distress.No Wheezing or rales.  ABDOMEN: Soft. Bowel sounds are normal. No distension and no mass.  There is no tenderness. There is no rebound and no guarding. No HSM.  MUSCULOSKELETAL: Normal range of motion. No edema and no tenderness. Mild kyphosis, no tenderness.  Left hip surgical incision is intact  LYMPH NODES: Has no cervical, supraclavicular, axillary and groin adenopathy.   NEUROLOGICAL: Alert and oriented to person, place, and time. No cranial nerve deficit.  Normal muscle tone. Coordination normal.   GENITOURINARY: Deferred exam.  SKIN: Skin is warm and dry. No rash noted. No erythema. No pallor.   EXTREMITIES: No " cyanosis, no clubbing, no edema. No Deformity.  PSYCHIATRIC: Normal mood, affect and behavior.      Lab Results     Recent Results (from the past 240 hour(s))   Basic metabolic panel    Collection Time: 03/07/22  9:31 AM   Result Value Ref Range    Sodium 139 136 - 145 mmol/L    Potassium 3.9 3.5 - 5.0 mmol/L    Chloride 105 98 - 107 mmol/L    Carbon Dioxide (CO2) 19 (L) 22 - 31 mmol/L    Anion Gap 15 5 - 18 mmol/L    Urea Nitrogen 12 8 - 28 mg/dL    Creatinine 1.01 0.60 - 1.10 mg/dL    Calcium 10.4 8.5 - 10.5 mg/dL    Glucose 138 (H) 70 - 125 mg/dL    GFR Estimate 58 (L) >60 mL/min/1.73m2   CBC with platelets and differential    Collection Time: 03/07/22  9:31 AM   Result Value Ref Range    WBC Count 10.5 4.0 - 11.0 10e3/uL    RBC Count 4.65 3.80 - 5.20 10e6/uL    Hemoglobin 15.2 11.7 - 15.7 g/dL    Hematocrit 44.2 35.0 - 47.0 %    MCV 95 78 - 100 fL    MCH 32.7 26.5 - 33.0 pg    MCHC 34.4 31.5 - 36.5 g/dL    RDW 13.0 10.0 - 15.0 %    Platelet Count 327 150 - 450 10e3/uL    % Neutrophils 75 %    % Lymphocytes 17 %    % Monocytes 6 %    % Eosinophils 1 %    % Basophils 1 %    % Immature Granulocytes 0 %    NRBCs per 100 WBC 0 <1 /100    Absolute Neutrophils 8.0 1.6 - 8.3 10e3/uL    Absolute Lymphocytes 1.8 0.8 - 5.3 10e3/uL    Absolute Monocytes 0.6 0.0 - 1.3 10e3/uL    Absolute Eosinophils 0.1 0.0 - 0.7 10e3/uL    Absolute Basophils 0.1 0.0 - 0.2 10e3/uL    Absolute Immature Granulocytes 0.0 <=0.4 10e3/uL    Absolute NRBCs 0.0 10e3/uL   Extra Red Top Tube    Collection Time: 03/07/22  9:31 AM   Result Value Ref Range    Hold Specimen JIC    Extra Green Top (Lithium Heparin) Tube    Collection Time: 03/07/22  9:31 AM   Result Value Ref Range    Hold Specimen     Extra Purple Top Tube    Collection Time: 03/07/22  9:31 AM   Result Value Ref Range    Hold Specimen     Asymptomatic COVID-19 Virus (Coronavirus) by PCR Nasopharyngeal    Collection Time: 03/07/22  2:47 PM    Specimen: Nasopharyngeal; Swab   Result Value  Ref Range    SARS CoV2 PCR Negative Negative   ECG 12-LEAD WITH MUSE (LHE)    Collection Time: 03/07/22  5:07 PM   Result Value Ref Range    Systolic Blood Pressure  mmHg    Diastolic Blood Pressure  mmHg    Ventricular Rate 86 BPM    Atrial Rate 86 BPM    IN Interval 136 ms    QRS Duration 128 ms     ms    QTc 533 ms    P Axis 78 degrees    R AXIS -2 degrees    T Axis 67 degrees    Interpretation ECG       Sinus rhythm  Right atrial enlargement  Right bundle branch block  Abnormal ECG  When compared with ECG of 23-JUN-2013 12:27,  No significant change was found  Confirmed by ANDREINA OWENS MD LOC:MAX ([id number]) on 3/8/2022 12:39:25 PM     Hemoglobin    Collection Time: 03/08/22  6:05 AM   Result Value Ref Range    Hemoglobin 13.0 11.7 - 15.7 g/dL   Glucose    Collection Time: 03/08/22  6:05 AM   Result Value Ref Range    Glucose 160 (H) 70 - 125 mg/dL   Glucose    Collection Time: 03/09/22  5:55 AM   Result Value Ref Range    Glucose 108 70 - 125 mg/dL   Hemoglobin    Collection Time: 03/09/22  6:28 AM   Result Value Ref Range    Hemoglobin 11.2 (L) 11.7 - 15.7 g/dL   UA with Microscopic reflex to Culture    Collection Time: 03/10/22  7:00 PM    Specimen: Urine, Catheter   Result Value Ref Range    Color Urine Yellow Colorless, Straw, Light Yellow, Yellow    Appearance Urine Clear Clear    Glucose Urine Negative Negative mg/dL    Bilirubin Urine Negative Negative    Ketones Urine 20  (A) Negative mg/dL    Specific Gravity Urine 1.021 1.001 - 1.030    Blood Urine 0.03 mg/dL (A) Negative    pH Urine 6.0 5.0 - 7.0    Protein Albumin Urine 10  (A) Negative mg/dL    Urobilinogen Urine 2.0 (A) <2.0 mg/dL    Nitrite Urine Negative Negative    Leukocyte Esterase Urine Negative Negative    Mucus Urine Present (A) None Seen /LPF    Amorphous Crystals Urine Few (A) None Seen /HPF    RBC Urine 7 (H) <=2 /HPF    WBC Urine 2 <=5 /HPF    Squamous Epithelials Urine <1 <=1 /HPF   COVID-19 Virus (Coronavirus) by PCR  Nasopharyngeal    Collection Time: 03/11/22  6:00 PM    Specimen: Nasopharyngeal; Swab   Result Value Ref Range    SARS CoV2 PCR Negative Negative             Imaging Results     XR Chest 1 View    Result Date: 3/7/2022  EXAM: XR CHEST 1 VIEW LOCATION: [facility] DATE/TIME: 3/7/2022 11:23 AM INDICATION: Pre op. Hip fracture. COMPARISON: None.     IMPRESSION: The lungs are clear. The heart size and pulmonary vascularity are normal. There is no pneumothorax or pleural effusion.    XR Pelvis and Hip Left 2 Views    Result Date: 3/7/2022  EXAM: XR PELVIS AND HIP LEFT 2 VIEWS LOCATION: [facility] DATE/TIME: 3/7/2022 11:23 AM INDICATION: Left hip pain. COMPARISON: None.     IMPRESSION: Acute comminuted moderately displaced intertrochanteric fracture of the left femur with resulting varus deformity. No additional fractures. No degenerative changes.    XR Surgery THAI  Fluoro G/T 5 Min    Result Date: 3/7/2022  This exam was marked as non-reportable because it will not be read by a radiologist or a [place] non-radiologist provider.           Electronically signed by    Steph Perea MD

## 2022-03-14 ENCOUNTER — TRANSITIONAL CARE UNIT VISIT (OUTPATIENT)
Dept: GERIATRICS | Facility: CLINIC | Age: 77
End: 2022-03-14
Payer: COMMERCIAL

## 2022-03-14 VITALS
DIASTOLIC BLOOD PRESSURE: 79 MMHG | SYSTOLIC BLOOD PRESSURE: 133 MMHG | HEART RATE: 88 BPM | HEIGHT: 61 IN | BODY MASS INDEX: 24.73 KG/M2 | OXYGEN SATURATION: 99 % | TEMPERATURE: 98.8 F | WEIGHT: 131 LBS | RESPIRATION RATE: 17 BRPM

## 2022-03-14 DIAGNOSIS — Z98.890 S/P ORIF (OPEN REDUCTION INTERNAL FIXATION) FRACTURE: Primary | ICD-10-CM

## 2022-03-14 DIAGNOSIS — N18.31 CHRONIC KIDNEY DISEASE, STAGE 3A (H): ICD-10-CM

## 2022-03-14 DIAGNOSIS — I10 PRIMARY HYPERTENSION: ICD-10-CM

## 2022-03-14 DIAGNOSIS — D62 ABLA (ACUTE BLOOD LOSS ANEMIA): ICD-10-CM

## 2022-03-14 DIAGNOSIS — Z87.81 S/P ORIF (OPEN REDUCTION INTERNAL FIXATION) FRACTURE: Primary | ICD-10-CM

## 2022-03-14 PROCEDURE — 99305 1ST NF CARE MODERATE MDM 35: CPT | Performed by: FAMILY MEDICINE

## 2022-03-14 RX ORDER — TRAMADOL HYDROCHLORIDE 50 MG/1
25 TABLET ORAL 2 TIMES DAILY PRN
COMMUNITY

## 2022-03-14 NOTE — LETTER
3/14/2022        RE: Nereyda Vincent  [address]  [facility] [address]        [facility] GERIATRIC SERVICES       Patient Nereyda Vincent  MRN: [id number]        Reason for Visit     Chief Complaint   Patient presents with     RECHECK       Code Status     CPR/Full code     Assessment     l femur fracture s/p ORIF  History of a mechanical fall  HTN  ABLA  Generalized weakness  constipation    Plan     Pt is admitted to TCU for strengthening and rehab.  Pt admitted to TCU after elective hip surgery  Date of procedure-   Continue with incisional cares  WBAT  Follow-up with orthopedics as scheduled  Cont PT / OT for strengthening/ gait retraining  Pain management optimized  Reports poor pain coontrol  Tylenol scheduled 1 g every 8 hours  Continue narcotics prn-on tramadol as needed; REFILL GIVEN  Advised aggressive icing  On lovenox  for dvt prophylaxis  Duration to be determined by orthopedics  Tubigrip stockings recommended for management of swelling of the lower extremities     In addition she is now on amlodipine for management of her blood pressure  Recheck labs; ua done was neg  Melatonin 3mg po qhs  Continue with PT/OT-remains very weak and bedbound  Also reporting significant constipation with no BM reported since admission.  Senna added to her regimen.  Nursing advised to administer her suppository if no BM is reported    History     Patient is a very pleasant 77 year old female who is admitted to TCU  Patient presented after a mechanical fall she apparently slipped on ice.  She was noted to have displaced left hip femur fracture.  Orthopedics was consulted.  She underwent surgical repair of her hip fracture.  She is discharged weightbearing as tolerated  Postoperatively course complicated by elevated blood pressures.  She was started on amlodipine.  Her blood pressures will require monitoring.  She also had some blood loss anemia but did not require a blood transfusion.  Pain management optimized  she is on Tylenol with tramadol.    Past Medical & Surgical History     PAST MEDICAL HISTORY:   Past Medical History:   Diagnosis Date     Elevated blood pressure 4/26/2016      PAST SURGICAL HISTORY:   has a past surgical history that includes Hip Pinning (Left, 3/7/2022).      Past Social History     Reviewed,  reports that she has been smoking. She has been smoking about 0.50 packs per day. She has never used smokeless tobacco. She reports that she does not drink alcohol and does not use drugs.    Family History     Reviewed, and family history includes Breast Cancer (age of onset: 60.00) in her mother; Colon Cancer in her mother; Dementia in her mother; Diabetes in her father.    Medication List   Post Discharge Medication Reconciliation Status: Post Discharge Medication Reconciliation Status: discharge medications reconciled, continue medications without change.  Current Outpatient Medications   Medication     acetaminophen (TYLENOL) 325 MG tablet     amLODIPine (NORVASC) 5 MG tablet     cyanocobalamin (VITAMIN B-12) 1000 MCG tablet     enoxaparin ANTICOAGULANT (LOVENOX) 30 MG/0.3ML syringe     melatonin 3 MG tablet     traMADol (ULTRAM) 50 MG tablet     Vitamin D, Cholecalciferol, 25 MCG (1000 UT) TABS     No current facility-administered medications for this visit.          Allergies     Allergies   Allergen Reactions     Codeine Nausea and Vomiting and Nausea       Review of Systems   A comprehensive review of 14 systems was done. Pertinent findings noted here and in history of present illness. All the rest negative.  Constitutional: Negative.  Negative for fever, chills, she has  activity change, appetite change and fatigue.   HENT: Negative for congestion and facial swelling.    Eyes: Negative for photophobia, redness and visual disturbance.   Respiratory: Negative for cough and chest tightness.    Cardiovascular: Negative for chest pain, palpitations and leg swelling.   Gastrointestinal: Negative for  "nausea, diarrhea, having significant constipation, patient reports no BM since admission to the TCU, no blood in stool and abdominal distention.   Genitourinary: Negative.    Musculoskeletal: Reporting poor pain control  Skin: Negative.    Neurological: Negative for dizziness, tremors, syncope, weakness, light-headedness and headaches.   Hematological: Does not bruise/bleed easily.   Psychiatric/Behavioral: Negative.  Anxious about pain management and reports her pain is not controlled well  Melatonin added to her regimen since patient was complaining she is not sleeping well      Physical Exam   /79   Pulse 88   Temp 98.8  F (37.1  C)   Resp 17   Ht 1.549 m (5' 1\")   Wt 59.4 kg (131 lb)   SpO2 99%   BMI 24.75 kg/m       Constitutional: Oriented to person, place, and time and appears well-developed.   HEENT:  Normocephalic and atraumatic.  Eyes: Conjunctivae and EOM are normal. Pupils are equal, round, and reactive to light. No discharge.  No scleral icterus. Nose normal. Mouth/Throat: Oropharynx is clear and moist. No oropharyngeal exudate.    NECK: Normal range of motion. Neck supple. No JVD present. No tracheal deviation present. No thyromegaly present.   CARDIOVASCULAR: Normal rate, regular rhythm and intact distal pulses.  Exam reveals no gallop and no friction rub.  Systolic murmur present.  PULMONARY: Effort normal and breath sounds normal. No respiratory distress.No Wheezing or rales.  ABDOMEN: Soft. Bowel sounds are normal. No distension and no mass.  There is no tenderness. There is no rebound and no guarding. No HSM.  MUSCULOSKELETAL: Normal range of motion. No edema and no tenderness. Mild kyphosis, no tenderness.  Left hip surgical incision is intact  LYMPH NODES: Has no cervical, supraclavicular, axillary and groin adenopathy.   NEUROLOGICAL: Alert and oriented to person, place, and time. No cranial nerve deficit.  Normal muscle tone. Coordination normal.   GENITOURINARY: Deferred " exam.  SKIN: Skin is warm and dry. No rash noted. No erythema. No pallor.   EXTREMITIES: No cyanosis, no clubbing, no edema. No Deformity.  PSYCHIATRIC: Normal mood, affect and behavior.      Lab Results     Recent Results (from the past 240 hour(s))   Basic metabolic panel    Collection Time: 03/07/22  9:31 AM   Result Value Ref Range    Sodium 139 136 - 145 mmol/L    Potassium 3.9 3.5 - 5.0 mmol/L    Chloride 105 98 - 107 mmol/L    Carbon Dioxide (CO2) 19 (L) 22 - 31 mmol/L    Anion Gap 15 5 - 18 mmol/L    Urea Nitrogen 12 8 - 28 mg/dL    Creatinine 1.01 0.60 - 1.10 mg/dL    Calcium 10.4 8.5 - 10.5 mg/dL    Glucose 138 (H) 70 - 125 mg/dL    GFR Estimate 58 (L) >60 mL/min/1.73m2   CBC with platelets and differential    Collection Time: 03/07/22  9:31 AM   Result Value Ref Range    WBC Count 10.5 4.0 - 11.0 10e3/uL    RBC Count 4.65 3.80 - 5.20 10e6/uL    Hemoglobin 15.2 11.7 - 15.7 g/dL    Hematocrit 44.2 35.0 - 47.0 %    MCV 95 78 - 100 fL    MCH 32.7 26.5 - 33.0 pg    MCHC 34.4 31.5 - 36.5 g/dL    RDW 13.0 10.0 - 15.0 %    Platelet Count 327 150 - 450 10e3/uL    % Neutrophils 75 %    % Lymphocytes 17 %    % Monocytes 6 %    % Eosinophils 1 %    % Basophils 1 %    % Immature Granulocytes 0 %    NRBCs per 100 WBC 0 <1 /100    Absolute Neutrophils 8.0 1.6 - 8.3 10e3/uL    Absolute Lymphocytes 1.8 0.8 - 5.3 10e3/uL    Absolute Monocytes 0.6 0.0 - 1.3 10e3/uL    Absolute Eosinophils 0.1 0.0 - 0.7 10e3/uL    Absolute Basophils 0.1 0.0 - 0.2 10e3/uL    Absolute Immature Granulocytes 0.0 <=0.4 10e3/uL    Absolute NRBCs 0.0 10e3/uL   Extra Red Top Tube    Collection Time: 03/07/22  9:31 AM   Result Value Ref Range    Hold Specimen JIC    Extra Green Top (Lithium Heparin) Tube    Collection Time: 03/07/22  9:31 AM   Result Value Ref Range    Hold Specimen     Extra Purple Top Tube    Collection Time: 03/07/22  9:31 AM   Result Value Ref Range    Hold Specimen     Asymptomatic COVID-19 Virus (Coronavirus) by PCR  Nasopharyngeal    Collection Time: 03/07/22  2:47 PM    Specimen: Nasopharyngeal; Swab   Result Value Ref Range    SARS CoV2 PCR Negative Negative   ECG 12-LEAD WITH MUSE (LHE)    Collection Time: 03/07/22  5:07 PM   Result Value Ref Range    Systolic Blood Pressure  mmHg    Diastolic Blood Pressure  mmHg    Ventricular Rate 86 BPM    Atrial Rate 86 BPM    AZ Interval 136 ms    QRS Duration 128 ms     ms    QTc 533 ms    P Axis 78 degrees    R AXIS -2 degrees    T Axis 67 degrees    Interpretation ECG       Sinus rhythm  Right atrial enlargement  Right bundle branch block  Abnormal ECG  When compared with ECG of 23-JUN-2013 12:27,  No significant change was found  Confirmed by ANDREINA OWENS MD LOC:JN ([id number]) on 3/8/2022 12:39:25 PM     Hemoglobin    Collection Time: 03/08/22  6:05 AM   Result Value Ref Range    Hemoglobin 13.0 11.7 - 15.7 g/dL   Glucose    Collection Time: 03/08/22  6:05 AM   Result Value Ref Range    Glucose 160 (H) 70 - 125 mg/dL   Glucose    Collection Time: 03/09/22  5:55 AM   Result Value Ref Range    Glucose 108 70 - 125 mg/dL   Hemoglobin    Collection Time: 03/09/22  6:28 AM   Result Value Ref Range    Hemoglobin 11.2 (L) 11.7 - 15.7 g/dL   UA with Microscopic reflex to Culture    Collection Time: 03/10/22  7:00 PM    Specimen: Urine, Catheter   Result Value Ref Range    Color Urine Yellow Colorless, Straw, Light Yellow, Yellow    Appearance Urine Clear Clear    Glucose Urine Negative Negative mg/dL    Bilirubin Urine Negative Negative    Ketones Urine 20  (A) Negative mg/dL    Specific Gravity Urine 1.021 1.001 - 1.030    Blood Urine 0.03 mg/dL (A) Negative    pH Urine 6.0 5.0 - 7.0    Protein Albumin Urine 10  (A) Negative mg/dL    Urobilinogen Urine 2.0 (A) <2.0 mg/dL    Nitrite Urine Negative Negative    Leukocyte Esterase Urine Negative Negative    Mucus Urine Present (A) None Seen /LPF    Amorphous Crystals Urine Few (A) None Seen /HPF    RBC Urine 7 (H) <=2 /HPF    WBC Urine  2 <=5 /HPF    Squamous Epithelials Urine <1 <=1 /HPF   COVID-19 Virus (Coronavirus) by PCR Nasopharyngeal    Collection Time: 03/11/22  6:00 PM    Specimen: Nasopharyngeal; Swab   Result Value Ref Range    SARS CoV2 PCR Negative Negative             Imaging Results     XR Chest 1 View    Result Date: 3/7/2022  EXAM: XR CHEST 1 VIEW LOCATION: [facility] DATE/TIME: 3/7/2022 11:23 AM INDICATION: Pre op. Hip fracture. COMPARISON: None.     IMPRESSION: The lungs are clear. The heart size and pulmonary vascularity are normal. There is no pneumothorax or pleural effusion.    XR Pelvis and Hip Left 2 Views    Result Date: 3/7/2022  EXAM: XR PELVIS AND HIP LEFT 2 VIEWS LOCATION: [facility] DATE/TIME: 3/7/2022 11:23 AM INDICATION: Left hip pain. COMPARISON: None.     IMPRESSION: Acute comminuted moderately displaced intertrochanteric fracture of the left femur with resulting varus deformity. No additional fractures. No degenerative changes.    XR Surgery THAI  Fluoro G/T 5 Min    Result Date: 3/7/2022  This exam was marked as non-reportable because it will not be read by a radiologist or a [place] non-radiologist provider.           Electronically signed by    Steph Perea MD                             Sincerely,        GHAZALA Connolly

## 2022-03-16 ENCOUNTER — LAB REQUISITION (OUTPATIENT)
Dept: LAB | Facility: CLINIC | Age: 77
End: 2022-03-16
Payer: COMMERCIAL

## 2022-03-16 DIAGNOSIS — D64.9 ANEMIA, UNSPECIFIED: ICD-10-CM

## 2022-03-17 ENCOUNTER — TRANSITIONAL CARE UNIT VISIT (OUTPATIENT)
Dept: GERIATRICS | Facility: CLINIC | Age: 77
End: 2022-03-17
Payer: COMMERCIAL

## 2022-03-17 VITALS
SYSTOLIC BLOOD PRESSURE: 133 MMHG | RESPIRATION RATE: 17 BRPM | BODY MASS INDEX: 24.55 KG/M2 | WEIGHT: 130 LBS | HEART RATE: 88 BPM | DIASTOLIC BLOOD PRESSURE: 79 MMHG | TEMPERATURE: 97.9 F | OXYGEN SATURATION: 97 % | HEIGHT: 61 IN

## 2022-03-17 DIAGNOSIS — Z87.81 S/P ORIF (OPEN REDUCTION INTERNAL FIXATION) FRACTURE: Primary | ICD-10-CM

## 2022-03-17 DIAGNOSIS — Z98.890 S/P ORIF (OPEN REDUCTION INTERNAL FIXATION) FRACTURE: Primary | ICD-10-CM

## 2022-03-17 DIAGNOSIS — K59.04 CHRONIC IDIOPATHIC CONSTIPATION: ICD-10-CM

## 2022-03-17 DIAGNOSIS — I10 PRIMARY HYPERTENSION: ICD-10-CM

## 2022-03-17 DIAGNOSIS — R39.15 URINARY URGENCY: ICD-10-CM

## 2022-03-17 DIAGNOSIS — D62 ABLA (ACUTE BLOOD LOSS ANEMIA): ICD-10-CM

## 2022-03-17 LAB
ANION GAP SERPL CALCULATED.3IONS-SCNC: 9 MMOL/L (ref 5–18)
BUN SERPL-MCNC: 11 MG/DL (ref 8–28)
CALCIUM SERPL-MCNC: 10.6 MG/DL (ref 8.5–10.5)
CHLORIDE BLD-SCNC: 106 MMOL/L (ref 98–107)
CO2 SERPL-SCNC: 26 MMOL/L (ref 22–31)
CREAT SERPL-MCNC: 0.78 MG/DL (ref 0.6–1.1)
ERYTHROCYTE [DISTWIDTH] IN BLOOD BY AUTOMATED COUNT: 13.2 % (ref 10–15)
GFR SERPL CREATININE-BSD FRML MDRD: 79 ML/MIN/1.73M2
GLUCOSE BLD-MCNC: 86 MG/DL (ref 70–125)
HCT VFR BLD AUTO: 33 % (ref 35–47)
HGB BLD-MCNC: 10.9 G/DL (ref 11.7–15.7)
MAGNESIUM SERPL-MCNC: 2 MG/DL (ref 1.8–2.6)
MCH RBC QN AUTO: 33 PG (ref 26.5–33)
MCHC RBC AUTO-ENTMCNC: 33 G/DL (ref 31.5–36.5)
MCV RBC AUTO: 100 FL (ref 78–100)
PLATELET # BLD AUTO: 383 10E3/UL (ref 150–450)
POTASSIUM BLD-SCNC: 3.7 MMOL/L (ref 3.5–5)
RBC # BLD AUTO: 3.3 10E6/UL (ref 3.8–5.2)
SODIUM SERPL-SCNC: 141 MMOL/L (ref 136–145)
WBC # BLD AUTO: 7.5 10E3/UL (ref 4–11)

## 2022-03-17 PROCEDURE — 85027 COMPLETE CBC AUTOMATED: CPT | Performed by: FAMILY MEDICINE

## 2022-03-17 PROCEDURE — 83735 ASSAY OF MAGNESIUM: CPT | Performed by: FAMILY MEDICINE

## 2022-03-17 PROCEDURE — 99309 SBSQ NF CARE MODERATE MDM 30: CPT | Performed by: NURSE PRACTITIONER

## 2022-03-17 PROCEDURE — 82310 ASSAY OF CALCIUM: CPT | Performed by: FAMILY MEDICINE

## 2022-03-17 PROCEDURE — 36415 COLL VENOUS BLD VENIPUNCTURE: CPT | Performed by: FAMILY MEDICINE

## 2022-03-17 PROCEDURE — P9604 ONE-WAY ALLOW PRORATED TRIP: HCPCS | Performed by: FAMILY MEDICINE

## 2022-03-17 RX ORDER — AMOXICILLIN 250 MG
1 CAPSULE ORAL 2 TIMES DAILY PRN
COMMUNITY

## 2022-03-17 RX ORDER — ACETAMINOPHEN 500 MG
1000 TABLET ORAL 3 TIMES DAILY
COMMUNITY

## 2022-03-17 NOTE — LETTER
3/17/2022        RE: Nereyda Vincent  [address]  [facility] [address]        Code Status:  FULL CODE  Visit Type: RECHECK     Facility:   [facility] ([facility]) [[id number]]        History of Present Illness:   Hospital Admission Date: 3/7/2022    Hospital Discharge Date: 3/9/2022      Nereyda Vincent is a 77 year old female with past medical history for tobacco dependence.  She was recently hospitalized after a fall in which she sustained a left intertrochanteric femur fracture with mild displacement.  She underwent an ORIF .  Bps were elevated and so she was started on amlodipine.  She has ABLA hgb 15.2 down to 11.2.      Today, she reports feeling generally well.  She is sleeping better after starting melatonin.  She reports that she hasn't had a BM in the past week.  Documentation states she has not had a BM since 3/10.        Past Medical History:   Diagnosis Date     Elevated blood pressure 4/26/2016     Past Surgical History:   Procedure Laterality Date     HIP PINNING Left 3/7/2022    Procedure: INTERNAL FIXATION, FRACTURE, TROCHANTERIC, HIP, USING PINS OR RODS, USING HANA TABLE-LEFT;  Surgeon: Luke Hicks MD;  Location: [facility] Main OR     Family History   Problem Relation Age of Onset     Breast Cancer Mother 60.00     Colon Cancer Mother      Dementia Mother      Diabetes Father      Social History     Socioeconomic History     Marital status:      Spouse name: Not on file     Number of children: Not on file     Years of education: Not on file     Highest education level: Not on file   Occupational History     Not on file   Tobacco Use     Smoking status: Current Every Day Smoker     Packs/day: 0.50     Smokeless tobacco: Never Used   Substance and Sexual Activity     Alcohol use: No     Drug use: Never     Sexual activity: Never   Other Topics Concern     Not on file   Social History Narrative     Not on file     Social Determinants of Health     Financial Resource  Strain: Not on file   Food Insecurity: Not on file   Transportation Needs: Not on file   Physical Activity: Not on file   Stress: Not on file   Social Connections: Not on file   Intimate Partner Violence: Not on file   Housing Stability: Not on file       Current Outpatient Medications   Medication Sig Dispense Refill     acetaminophen (TYLENOL) 500 MG tablet Take 1,000 mg by mouth 3 times daily       amLODIPine (NORVASC) 5 MG tablet Take 1 tablet (5 mg) by mouth daily       cyanocobalamin (VITAMIN B-12) 1000 MCG tablet Take 1,000 mcg by mouth daily       enoxaparin ANTICOAGULANT (LOVENOX) 30 MG/0.3ML syringe Inject 0.3 mLs (30 mg) Subcutaneous daily 4.2 mL 0     melatonin 3 MG tablet Take 1 mg by mouth At Bedtime       senna-docusate (SENOKOT-S/PERICOLACE) 8.6-50 MG tablet Take 1 tablet by mouth 2 times daily as needed for constipation       traMADol (ULTRAM) 50 MG tablet Take 50 mg by mouth every 6 hours as needed for severe pain       Vitamin D, Cholecalciferol, 25 MCG (1000 UT) TABS Take 1 tablet by mouth daily       acetaminophen (TYLENOL) 325 MG tablet Take 2 tablets (650 mg) by mouth every 6 hours as needed for mild pain or other (and adjunct with moderate or severe pain or per patient request) (Patient not taking: Reported on 3/17/2022)       Allergies   Allergen Reactions     Codeine Nausea and Vomiting and Nausea     Immunization History   Administered Date(s) Administered     COVID-19ROSALINDPfizer (12+ Yrs) 03/03/2021, 03/24/2021     Influenza (High Dose) 3 valent vaccine 10/17/2019     Pneumo Conj 13-V (2010&after) 04/29/2019     Pneumococcal 23 valent 02/13/2012     TD (ADULT, 7+) 02/13/2012     Td,adult,historic,unspecified 02/13/2012       Medications list and allergies in the facility chart have been reviewed.  Please see facility EMR for most up to date list.       Review of Systems   Patient denies fever, chills, headache, lightheadedness, dizziness, rhinorrhea, cough, congestion, shortness of breath,  "chest pain, palpitations, abdominal pain, n/v, diarrhea, constipation, change in appetite, change in sleep habits, dysuria, frequency, burning or pain with urination.  Other than stated in HPI all other review of systems is negative.         Physical Exam   Vital signs:/79   Pulse 88   Temp 97.9  F (36.6  C)   Resp 17   Ht 1.549 m (5' 1\")   Wt 59 kg (130 lb)   SpO2 97%   BMI 24.56 kg/m     GENERAL APPEARANCE: Well developed, well nourished, in no acute distress.  HEENT: normocephalic, atraumatic   sclerae anicteric, conjunctivae clear and moist, EOM intact  LUNGS: Lung sounds CTA, no adventitious sounds, respiratory effort normal.  CARD: RRR, S1, S2, without murmurs, gallops or rubs  ABD: Soft, nondistended and nontender with normal bowel sounds.   MSK: Muscle strength and tone were equal bilaterally.  EXTREMITIES: no LE edema, no calf tenderness and good CMS  NEURO: Alert and oriented x 3.Face is symmetric.  SKIN: hip incision well approximated with sutures without drainage or s/s of infection.   PSYCH: euthymic            Labs:   Last Comprehensive Metabolic Panel:  Sodium   Date Value Ref Range Status   03/17/2022 141 136 - 145 mmol/L Final     Potassium   Date Value Ref Range Status   03/17/2022 3.7 3.5 - 5.0 mmol/L Final     Chloride   Date Value Ref Range Status   03/17/2022 106 98 - 107 mmol/L Final     Carbon Dioxide (CO2)   Date Value Ref Range Status   03/17/2022 26 22 - 31 mmol/L Final     Anion Gap   Date Value Ref Range Status   03/17/2022 9 5 - 18 mmol/L Final     Glucose   Date Value Ref Range Status   03/17/2022 86 70 - 125 mg/dL Final     Urea Nitrogen   Date Value Ref Range Status   03/17/2022 11 8 - 28 mg/dL Final     Creatinine   Date Value Ref Range Status   03/17/2022 0.78 0.60 - 1.10 mg/dL Final     GFR Estimate   Date Value Ref Range Status   03/17/2022 79 >60 mL/min/1.73m2 Final     Comment:     Effective December 21, 2021 eGFRcr in adults is calculated using the 2021 CKD-EPI " creatinine equation which includes age and gender (Haile et al., NEJ, DOI: 10.1056/BVXKfe1199422)   05/26/2021 57 (L) >60 mL/min/1.73m2 Final     Calcium   Date Value Ref Range Status   03/17/2022 10.6 (H) 8.5 - 10.5 mg/dL Final     Lab Results   Component Value Date    WBC 10.5 03/07/2022     Lab Results   Component Value Date    RBC 4.65 03/07/2022     Lab Results   Component Value Date    HGB 11.2 03/09/2022     Lab Results   Component Value Date    HCT 44.2 03/07/2022     Lab Results   Component Value Date    MCV 95 03/07/2022     Lab Results   Component Value Date    MCH 32.7 03/07/2022     Lab Results   Component Value Date    MCHC 34.4 03/07/2022     Lab Results   Component Value Date    RDW 13.0 03/07/2022     Lab Results   Component Value Date     03/07/2022           Assessment/plan:   S/P ORIF (open reduction internal fixation) fracture  Nursing to scheduled ortho follow up.  End date for lovenox 3/24.  Continue with pain management of prn tramadol and apap.    Urinary urgency  UA was negative for UTI, continue to monitor as the urgency is likely more related to unable to move fast enough to the bathroom    Chronic idiopathic constipation  Start glycerin suppository, continue with senna S and add miralax prn    Primary hypertension  Controlled on amlodipine    ABLA (acute blood loss anemia)  Recent hgb stable at 10.9    Sleep disturbance  Improved after starting melatonin.       Electronically signed by: Anabel Alfaro NP          Sincerely,        Anabel Alfaro NP

## 2022-03-17 NOTE — PROGRESS NOTES
Code Status:  FULL CODE  Visit Type: RECHECK     Facility:   [facility] ([facility]) [[id number]]        History of Present Illness:   Hospital Admission Date: 3/7/2022    Hospital Discharge Date: 3/9/2022      Nereyda Vincent is a 77 year old female with past medical history for tobacco dependence.  She was recently hospitalized after a fall in which she sustained a left intertrochanteric femur fracture with mild displacement.  She underwent an ORIF .  Bps were elevated and so she was started on amlodipine.  She has ABLA hgb 15.2 down to 11.2.      Today, she reports feeling generally well.  She is sleeping better after starting melatonin.  She reports that she hasn't had a BM in the past week.  Documentation states she has not had a BM since 3/10.        Past Medical History:   Diagnosis Date     Elevated blood pressure 4/26/2016     Past Surgical History:   Procedure Laterality Date     HIP PINNING Left 3/7/2022    Procedure: INTERNAL FIXATION, FRACTURE, TROCHANTERIC, HIP, USING PINS OR RODS, USING HANA TABLE-LEFT;  Surgeon: Luke Hicks MD;  Location: [facility] Main OR     Family History   Problem Relation Age of Onset     Breast Cancer Mother 60.00     Colon Cancer Mother      Dementia Mother      Diabetes Father      Social History     Socioeconomic History     Marital status:      Spouse name: Not on file     Number of children: Not on file     Years of education: Not on file     Highest education level: Not on file   Occupational History     Not on file   Tobacco Use     Smoking status: Current Every Day Smoker     Packs/day: 0.50     Smokeless tobacco: Never Used   Substance and Sexual Activity     Alcohol use: No     Drug use: Never     Sexual activity: Never   Other Topics Concern     Not on file   Social History Narrative     Not on file     Social Determinants of Health     Financial Resource Strain: Not on file   Food Insecurity: Not on file   Transportation Needs: Not on file    Physical Activity: Not on file   Stress: Not on file   Social Connections: Not on file   Intimate Partner Violence: Not on file   Housing Stability: Not on file       Current Outpatient Medications   Medication Sig Dispense Refill     acetaminophen (TYLENOL) 500 MG tablet Take 1,000 mg by mouth 3 times daily       amLODIPine (NORVASC) 5 MG tablet Take 1 tablet (5 mg) by mouth daily       cyanocobalamin (VITAMIN B-12) 1000 MCG tablet Take 1,000 mcg by mouth daily       enoxaparin ANTICOAGULANT (LOVENOX) 30 MG/0.3ML syringe Inject 0.3 mLs (30 mg) Subcutaneous daily 4.2 mL 0     melatonin 3 MG tablet Take 1 mg by mouth At Bedtime       senna-docusate (SENOKOT-S/PERICOLACE) 8.6-50 MG tablet Take 1 tablet by mouth 2 times daily as needed for constipation       traMADol (ULTRAM) 50 MG tablet Take 50 mg by mouth every 6 hours as needed for severe pain       Vitamin D, Cholecalciferol, 25 MCG (1000 UT) TABS Take 1 tablet by mouth daily       acetaminophen (TYLENOL) 325 MG tablet Take 2 tablets (650 mg) by mouth every 6 hours as needed for mild pain or other (and adjunct with moderate or severe pain or per patient request) (Patient not taking: Reported on 3/17/2022)       Allergies   Allergen Reactions     Codeine Nausea and Vomiting and Nausea     Immunization History   Administered Date(s) Administered     COVID-19ROSALINDPfizer (12+ Yrs) 03/03/2021, 03/24/2021     Influenza (High Dose) 3 valent vaccine 10/17/2019     Pneumo Conj 13-V (2010&after) 04/29/2019     Pneumococcal 23 valent 02/13/2012     TD (ADULT, 7+) 02/13/2012     Td,adult,historic,unspecified 02/13/2012       Medications list and allergies in the facility chart have been reviewed.  Please see facility EMR for most up to date list.       Review of Systems   Patient denies fever, chills, headache, lightheadedness, dizziness, rhinorrhea, cough, congestion, shortness of breath, chest pain, palpitations, abdominal pain, n/v, diarrhea, constipation, change in  "appetite, change in sleep habits, dysuria, frequency, burning or pain with urination.  Other than stated in HPI all other review of systems is negative.         Physical Exam   Vital signs:/79   Pulse 88   Temp 97.9  F (36.6  C)   Resp 17   Ht 1.549 m (5' 1\")   Wt 59 kg (130 lb)   SpO2 97%   BMI 24.56 kg/m     GENERAL APPEARANCE: Well developed, well nourished, in no acute distress.  HEENT: normocephalic, atraumatic   sclerae anicteric, conjunctivae clear and moist, EOM intact  LUNGS: Lung sounds CTA, no adventitious sounds, respiratory effort normal.  CARD: RRR, S1, S2, without murmurs, gallops or rubs  ABD: Soft, nondistended and nontender with normal bowel sounds.   MSK: Muscle strength and tone were equal bilaterally.  EXTREMITIES: no LE edema, no calf tenderness and good CMS  NEURO: Alert and oriented x 3.Face is symmetric.  SKIN: hip incision well approximated with sutures without drainage or s/s of infection.   PSYCH: euthymic            Labs:   Last Comprehensive Metabolic Panel:  Sodium   Date Value Ref Range Status   03/17/2022 141 136 - 145 mmol/L Final     Potassium   Date Value Ref Range Status   03/17/2022 3.7 3.5 - 5.0 mmol/L Final     Chloride   Date Value Ref Range Status   03/17/2022 106 98 - 107 mmol/L Final     Carbon Dioxide (CO2)   Date Value Ref Range Status   03/17/2022 26 22 - 31 mmol/L Final     Anion Gap   Date Value Ref Range Status   03/17/2022 9 5 - 18 mmol/L Final     Glucose   Date Value Ref Range Status   03/17/2022 86 70 - 125 mg/dL Final     Urea Nitrogen   Date Value Ref Range Status   03/17/2022 11 8 - 28 mg/dL Final     Creatinine   Date Value Ref Range Status   03/17/2022 0.78 0.60 - 1.10 mg/dL Final     GFR Estimate   Date Value Ref Range Status   03/17/2022 79 >60 mL/min/1.73m2 Final     Comment:     Effective December 21, 2021 eGFRcr in adults is calculated using the 2021 CKD-EPI creatinine equation which includes age and gender (Haile louis al., NEJ, DOI: " 10.1056/EHXMfy2823034)   05/26/2021 57 (L) >60 mL/min/1.73m2 Final     Calcium   Date Value Ref Range Status   03/17/2022 10.6 (H) 8.5 - 10.5 mg/dL Final     Lab Results   Component Value Date    WBC 10.5 03/07/2022     Lab Results   Component Value Date    RBC 4.65 03/07/2022     Lab Results   Component Value Date    HGB 11.2 03/09/2022     Lab Results   Component Value Date    HCT 44.2 03/07/2022     Lab Results   Component Value Date    MCV 95 03/07/2022     Lab Results   Component Value Date    MCH 32.7 03/07/2022     Lab Results   Component Value Date    MCHC 34.4 03/07/2022     Lab Results   Component Value Date    RDW 13.0 03/07/2022     Lab Results   Component Value Date     03/07/2022           Assessment/plan:   S/P ORIF (open reduction internal fixation) fracture  Nursing to scheduled ortho follow up.  End date for lovenox 3/24.  Continue with pain management of prn tramadol and apap.    Urinary urgency  UA was negative for UTI, continue to monitor as the urgency is likely more related to unable to move fast enough to the bathroom    Chronic idiopathic constipation  Start glycerin suppository, continue with senna S and add miralax prn    Primary hypertension  Controlled on amlodipine    ABLA (acute blood loss anemia)  Recent hgb stable at 10.9    Sleep disturbance  Improved after starting melatonin.       Electronically signed by: Anabel Alfaro NP

## 2022-03-23 ENCOUNTER — TRANSITIONAL CARE UNIT VISIT (OUTPATIENT)
Dept: GERIATRICS | Facility: CLINIC | Age: 77
End: 2022-03-23
Payer: COMMERCIAL

## 2022-03-23 VITALS
TEMPERATURE: 97.5 F | HEIGHT: 61 IN | OXYGEN SATURATION: 97 % | BODY MASS INDEX: 25.15 KG/M2 | WEIGHT: 133.2 LBS | SYSTOLIC BLOOD PRESSURE: 128 MMHG | DIASTOLIC BLOOD PRESSURE: 72 MMHG | RESPIRATION RATE: 18 BRPM | HEART RATE: 70 BPM

## 2022-03-23 DIAGNOSIS — K59.04 CHRONIC IDIOPATHIC CONSTIPATION: ICD-10-CM

## 2022-03-23 DIAGNOSIS — Z98.890 S/P ORIF (OPEN REDUCTION INTERNAL FIXATION) FRACTURE: Primary | ICD-10-CM

## 2022-03-23 DIAGNOSIS — D62 ABLA (ACUTE BLOOD LOSS ANEMIA): ICD-10-CM

## 2022-03-23 DIAGNOSIS — Z87.81 S/P ORIF (OPEN REDUCTION INTERNAL FIXATION) FRACTURE: Primary | ICD-10-CM

## 2022-03-23 DIAGNOSIS — I10 PRIMARY HYPERTENSION: ICD-10-CM

## 2022-03-23 DIAGNOSIS — G47.9 SLEEP DISTURBANCE: ICD-10-CM

## 2022-03-23 DIAGNOSIS — N18.31 CHRONIC KIDNEY DISEASE, STAGE 3A (H): ICD-10-CM

## 2022-03-23 PROCEDURE — 99316 NF DSCHRG MGMT 30 MIN+: CPT | Performed by: NURSE PRACTITIONER

## 2022-03-23 NOTE — LETTER
3/23/2022        RE: Nereyda Vincent  [address]  [facility] [address]        Code Status:  FULL CODE  Visit Type: RECHECK     Facility:   [facility] ([facility]) [[id number]]  PCP:  Gissel Hollis  [phone]       Admission Date to our Facility: 3/9/2022 Discharge Date from our Facility: 3/25/2022    Discharge Diagnosis:    S/P ORIF (open reduction internal fixation) fracture  Chronic idiopathic constipation  Primary hypertension  ABLA (acute blood loss anemia)  Chronic kidney disease, stage 3a (H)  Sleep disturbance        History of Present Illness: Nereyda Vincent is a 77 year old female with past medical history for tobacco dependence.  She was recently hospitalized after a fall in which she sustained a left intertrochanteric femur fracture with mild displacement.  She underwent an ORIF .  Bps were elevated and so she was started on amlodipine.  She has ABLA hgb 15.2 down to 11.2.     Skilled Nursing Facility Course:  [facility] at the [facility] she is has improved her function with therapy to ambulating with a walker independent.      S/P ORIF (open reduction internal fixation) fracture  Ortho follow up 3/24, suture removed tomorrow,  End date for lovenox 3/24.  Using minimal tramadol.  Order for tramadol changed today to BID prn.  Continue with scheduled apap.  Recommend changing to prn after returning home as able.      Urinary urgency  Had issues with urgency, UA was negative for UTI.  Resolved once moving easier.     Chronic idiopathic constipation  Started on Senna S BID and miralax prn.       Primary hypertension  Controlled on amlodipine     ABLA (acute blood loss anemia)  Recent hgb stable at 10.9     Sleep disturbance  Improved after starting melatonin.     Discharge Medications:   Current Outpatient Medications   Medication Sig Dispense Refill     acetaminophen (TYLENOL) 500 MG tablet Take 1,000 mg by mouth 3 times daily       amLODIPine (NORVASC) 5 MG tablet Take 1 tablet (5 mg) by mouth  "daily       cyanocobalamin (VITAMIN B-12) 1000 MCG tablet Take 1,000 mcg by mouth daily       enoxaparin ANTICOAGULANT (LOVENOX) 30 MG/0.3ML syringe Inject 0.3 mLs (30 mg) Subcutaneous daily 4.2 mL 0     glycerin (ADULT) 2 g suppository Place 1 suppository rectally daily as needed for constipation       melatonin 3 MG tablet Take 1 mg by mouth At Bedtime       senna-docusate (SENOKOT-S/PERICOLACE) 8.6-50 MG tablet Take 1 tablet by mouth 2 times daily as needed for constipation       traMADol (ULTRAM) 50 MG tablet Take 25 mg by mouth 2 times daily as needed for severe pain        Vitamin D, Cholecalciferol, 25 MCG (1000 UT) TABS Take 1 tablet by mouth daily         Discharge Plan:  Stable to discharge to home with daughter.  Counseled patient to follow up with PCP and orthopedics.        Review of Systems   Patient denies fever, chills, headache, lightheadedness, dizziness, rhinorrhea, cough, congestion, shortness of breath, chest pain, palpitations, abdominal pain, n/v, diarrhea, constipation, change in appetite, change in sleep pattern, dysuria, frequency, burning or pain with urination.  Other than stated in HPI all other review of systems is negative.         Physical Exam   Vital signs:/72   Pulse 70   Temp 97.5  F (36.4  C)   Resp 18   Ht 1.549 m (5' 1\")   Wt 60.4 kg (133 lb 3.2 oz)   SpO2 97%   BMI 25.17 kg/m     GENERAL APPEARANCE: Well developed, well nourished, in no acute distress.  HEENT: normocephalic, atraumatic  sclerae anicteric, conjunctivae clear and moist, EOM intact  LUNGS: Lung sounds CTA, no adventitious sounds, respiratory effort normal.  CARD: RRR, S1, S2, without murmurs, gallops, rubs  ABD: Soft, nondistended and nontender with normal bowel sounds.   MSK: Muscle strength and tone were equal bilaterally. Moves all extremities easily and intentionally.   EXTREMITIES: No cyanosis, clubbing or edema.  NEURO: Alert and oriented x 3.Face is symmetric.  SKIN: left hip incision well " approximated with sutures and no s/s of infection  PSYCH: euthymic          Labs:    Last Comprehensive Metabolic Panel:  Sodium   Date Value Ref Range Status   03/17/2022 141 136 - 145 mmol/L Final     Potassium   Date Value Ref Range Status   03/17/2022 3.7 3.5 - 5.0 mmol/L Final     Chloride   Date Value Ref Range Status   03/17/2022 106 98 - 107 mmol/L Final     Carbon Dioxide (CO2)   Date Value Ref Range Status   03/17/2022 26 22 - 31 mmol/L Final     Anion Gap   Date Value Ref Range Status   03/17/2022 9 5 - 18 mmol/L Final     Glucose   Date Value Ref Range Status   03/17/2022 86 70 - 125 mg/dL Final     Urea Nitrogen   Date Value Ref Range Status   03/17/2022 11 8 - 28 mg/dL Final     Creatinine   Date Value Ref Range Status   03/17/2022 0.78 0.60 - 1.10 mg/dL Final     GFR Estimate   Date Value Ref Range Status   03/17/2022 79 >60 mL/min/1.73m2 Final     Comment:     Effective December 21, 2021 eGFRcr in adults is calculated using the 2021 CKD-EPI creatinine equation which includes age and gender (Haile et al., NEJM, DOI: 10.1056/XACWtd4418357)   05/26/2021 57 (L) >60 mL/min/1.73m2 Final     Calcium   Date Value Ref Range Status   03/17/2022 10.6 (H) 8.5 - 10.5 mg/dL Final     Lab Results   Component Value Date    WBC 7.5 03/17/2022     Lab Results   Component Value Date    RBC 3.30 03/17/2022     Lab Results   Component Value Date    HGB 10.9 03/17/2022     Lab Results   Component Value Date    HCT 33.0 03/17/2022     Lab Results   Component Value Date     03/17/2022     Lab Results   Component Value Date    MCH 33.0 03/17/2022     Lab Results   Component Value Date    MCHC 33.0 03/17/2022     Lab Results   Component Value Date    RDW 13.2 03/17/2022     Lab Results   Component Value Date     03/17/2022           MEDICAL EQUIPMENT NEEDS:  Na      DISCHARGE PLAN/FACE TO FACE:  I certify that services are/were furnished while this patient was under the care of a physician and that a physician  or an allowed non-physician practitioner (NPP), had a face-to-face encounter that meets the physician face-to-face encounter requirements. The encounter was in whole, or in part, related to the primary reason for home health. The patient is confined to his/her home and needs intermittent skilled nursing, physical therapy, speech-language pathology, or the continued need for occupational therapy. A plan of care has been established by a physician and is periodically reviewed by a physician.    I certify that this patient is under my care and that I, or a nurse practitioner or physician's assistant working with me, had a face-to-face encounter that meets the physician face-to-face encounter requirements with this patient.   Date of Face-to-Face Encounter: 3/23/2022    I certify that, based on my findings, the following services are medically necessary home health services:  PT/OT    My clinical findings support the need for the above skilled services because:   PT/OT for ongoing strengthening and endurance    This patient is homebound because:  She requires maximum effort in order to get out into the community on a regular basis.     The patient is, or has been, under my care and I have initiated the establishment of the plan of care. This patient will be followed by a physician who will periodically review the plan of care.    35 total minutes spent with 20 minutes spent with patient in discharge counseling.     Electronically signed by: Anabel Alfaro NP          Sincerely,        Anabel Alfaro NP

## 2022-03-23 NOTE — PROGRESS NOTES
Code Status:  FULL CODE  Visit Type: RECHECK     Facility:   [facility] ([facility]) [[id number]]  PCP:  Gissel Hollis  [phone]       Admission Date to our Facility: 3/9/2022 Discharge Date from our Facility: 3/25/2022    Discharge Diagnosis:    S/P ORIF (open reduction internal fixation) fracture  Chronic idiopathic constipation  Primary hypertension  ABLA (acute blood loss anemia)  Chronic kidney disease, stage 3a (H)  Sleep disturbance        History of Present Illness: Nereyda Vincent is a 77 year old female with past medical history for tobacco dependence.  She was recently hospitalized after a fall in which she sustained a left intertrochanteric femur fracture with mild displacement.  She underwent an ORIF .  Bps were elevated and so she was started on amlodipine.  She has ABLA hgb 15.2 down to 11.2.     Skilled Nursing Facility Course:  [facility] at the [facility] she is has improved her function with therapy to ambulating with a walker independent.      S/P ORIF (open reduction internal fixation) fracture  Ortho follow up 3/24, suture removed tomorrow,  End date for lovenox 3/24.  Using minimal tramadol.  Order for tramadol changed today to BID prn.  Continue with scheduled apap.  Recommend changing to prn after returning home as able.      Urinary urgency  Had issues with urgency, UA was negative for UTI.  Resolved once moving easier.     Chronic idiopathic constipation  Started on Senna S BID and miralax prn.       Primary hypertension  Controlled on amlodipine     ABLA (acute blood loss anemia)  Recent hgb stable at 10.9     Sleep disturbance  Improved after starting melatonin.     Discharge Medications:   Current Outpatient Medications   Medication Sig Dispense Refill     acetaminophen (TYLENOL) 500 MG tablet Take 1,000 mg by mouth 3 times daily       amLODIPine (NORVASC) 5 MG tablet Take 1 tablet (5 mg) by mouth daily       cyanocobalamin (VITAMIN B-12) 1000 MCG tablet Take 1,000 mcg by mouth daily  "      enoxaparin ANTICOAGULANT (LOVENOX) 30 MG/0.3ML syringe Inject 0.3 mLs (30 mg) Subcutaneous daily 4.2 mL 0     glycerin (ADULT) 2 g suppository Place 1 suppository rectally daily as needed for constipation       melatonin 3 MG tablet Take 1 mg by mouth At Bedtime       senna-docusate (SENOKOT-S/PERICOLACE) 8.6-50 MG tablet Take 1 tablet by mouth 2 times daily as needed for constipation       traMADol (ULTRAM) 50 MG tablet Take 25 mg by mouth 2 times daily as needed for severe pain        Vitamin D, Cholecalciferol, 25 MCG (1000 UT) TABS Take 1 tablet by mouth daily         Discharge Plan:  Stable to discharge to home with daughter.  Counseled patient to follow up with PCP and orthopedics.        Review of Systems   Patient denies fever, chills, headache, lightheadedness, dizziness, rhinorrhea, cough, congestion, shortness of breath, chest pain, palpitations, abdominal pain, n/v, diarrhea, constipation, change in appetite, change in sleep pattern, dysuria, frequency, burning or pain with urination.  Other than stated in HPI all other review of systems is negative.         Physical Exam   Vital signs:/72   Pulse 70   Temp 97.5  F (36.4  C)   Resp 18   Ht 1.549 m (5' 1\")   Wt 60.4 kg (133 lb 3.2 oz)   SpO2 97%   BMI 25.17 kg/m     GENERAL APPEARANCE: Well developed, well nourished, in no acute distress.  HEENT: normocephalic, atraumatic  sclerae anicteric, conjunctivae clear and moist, EOM intact  LUNGS: Lung sounds CTA, no adventitious sounds, respiratory effort normal.  CARD: RRR, S1, S2, without murmurs, gallops, rubs  ABD: Soft, nondistended and nontender with normal bowel sounds.   MSK: Muscle strength and tone were equal bilaterally. Moves all extremities easily and intentionally.   EXTREMITIES: No cyanosis, clubbing or edema.  NEURO: Alert and oriented x 3.Face is symmetric.  SKIN: left hip incision well approximated with sutures and no s/s of infection  PSYCH: euthymic          Labs:    Last " Comprehensive Metabolic Panel:  Sodium   Date Value Ref Range Status   03/17/2022 141 136 - 145 mmol/L Final     Potassium   Date Value Ref Range Status   03/17/2022 3.7 3.5 - 5.0 mmol/L Final     Chloride   Date Value Ref Range Status   03/17/2022 106 98 - 107 mmol/L Final     Carbon Dioxide (CO2)   Date Value Ref Range Status   03/17/2022 26 22 - 31 mmol/L Final     Anion Gap   Date Value Ref Range Status   03/17/2022 9 5 - 18 mmol/L Final     Glucose   Date Value Ref Range Status   03/17/2022 86 70 - 125 mg/dL Final     Urea Nitrogen   Date Value Ref Range Status   03/17/2022 11 8 - 28 mg/dL Final     Creatinine   Date Value Ref Range Status   03/17/2022 0.78 0.60 - 1.10 mg/dL Final     GFR Estimate   Date Value Ref Range Status   03/17/2022 79 >60 mL/min/1.73m2 Final     Comment:     Effective December 21, 2021 eGFRcr in adults is calculated using the 2021 CKD-EPI creatinine equation which includes age and gender (Haile et al., NEJM, DOI: 10.1056/BBNNia8218478)   05/26/2021 57 (L) >60 mL/min/1.73m2 Final     Calcium   Date Value Ref Range Status   03/17/2022 10.6 (H) 8.5 - 10.5 mg/dL Final     Lab Results   Component Value Date    WBC 7.5 03/17/2022     Lab Results   Component Value Date    RBC 3.30 03/17/2022     Lab Results   Component Value Date    HGB 10.9 03/17/2022     Lab Results   Component Value Date    HCT 33.0 03/17/2022     Lab Results   Component Value Date     03/17/2022     Lab Results   Component Value Date    MCH 33.0 03/17/2022     Lab Results   Component Value Date    MCHC 33.0 03/17/2022     Lab Results   Component Value Date    RDW 13.2 03/17/2022     Lab Results   Component Value Date     03/17/2022           MEDICAL EQUIPMENT NEEDS:  Na      DISCHARGE PLAN/FACE TO FACE:  I certify that services are/were furnished while this patient was under the care of a physician and that a physician or an allowed non-physician practitioner (NPP), had a face-to-face encounter that meets the  physician face-to-face encounter requirements. The encounter was in whole, or in part, related to the primary reason for home health. The patient is confined to his/her home and needs intermittent skilled nursing, physical therapy, speech-language pathology, or the continued need for occupational therapy. A plan of care has been established by a physician and is periodically reviewed by a physician.    I certify that this patient is under my care and that I, or a nurse practitioner or physician's assistant working with me, had a face-to-face encounter that meets the physician face-to-face encounter requirements with this patient.   Date of Face-to-Face Encounter: 3/23/2022    I certify that, based on my findings, the following services are medically necessary home health services:  PT/OT    My clinical findings support the need for the above skilled services because:   PT/OT for ongoing strengthening and endurance    This patient is homebound because:  She requires maximum effort in order to get out into the community on a regular basis.     The patient is, or has been, under my care and I have initiated the establishment of the plan of care. This patient will be followed by a physician who will periodically review the plan of care.    35 total minutes spent with 20 minutes spent with patient in discharge counseling.     Electronically signed by: Anabel Alfaro NP

## 2022-04-13 ENCOUNTER — TRANSFERRED RECORDS (OUTPATIENT)
Dept: HEALTH INFORMATION MANAGEMENT | Facility: CLINIC | Age: 77
End: 2022-04-13
Payer: COMMERCIAL

## 2022-05-25 ENCOUNTER — TRANSFERRED RECORDS (OUTPATIENT)
Dept: HEALTH INFORMATION MANAGEMENT | Facility: CLINIC | Age: 77
End: 2022-05-25
Payer: COMMERCIAL

## 2022-08-07 ENCOUNTER — HEALTH MAINTENANCE LETTER (OUTPATIENT)
Age: 77
End: 2022-08-07

## 2022-10-29 ENCOUNTER — HEALTH MAINTENANCE LETTER (OUTPATIENT)
Age: 77
End: 2022-10-29

## 2023-09-02 ENCOUNTER — HEALTH MAINTENANCE LETTER (OUTPATIENT)
Age: 78
End: 2023-09-02

## 2024-07-15 NOTE — TELEPHONE ENCOUNTER
15-Jul-2024 07:25 APPOINTMENT NEEDS TO BE CHANGED.   [facility] WILL BE TRANSITIONING TO VIRTUAL CARE ONLY.   PLEASE ASSIST PT IN  RESCHEDULING AT DIFFERENT CLINIC FOR THIS VISIT.

## 2024-10-26 ENCOUNTER — HEALTH MAINTENANCE LETTER (OUTPATIENT)
Age: 79
End: 2024-10-26

## (undated) DEVICE — DRSG MEPILEX BORDER FLEX 3X3" 595200

## (undated) DEVICE — GLOVE BIOGEL INDICATOR 7.5 LF 41675

## (undated) DEVICE — DRAPE C-ARM 60X42" 1013

## (undated) DEVICE — GLOVE BIOGEL PI INDICATOR 8.0 LF 41680

## (undated) DEVICE — DRAPE C-ARMOR 5 SIDED 5523

## (undated) DEVICE — DRILL BIT QUICK COUPLING 3 FLUTE 4.2MMX330/100MM CALIBRATE

## (undated) DEVICE — SOL NACL 0.9% IRRIG 1000ML BOTTLE 2F7124

## (undated) DEVICE — PADDING CAST 4IN WEBRIL STRL 2502

## (undated) DEVICE — BLADE KNIFE SURG 15 371115

## (undated) DEVICE — DRESSING MEPILEX BORDER POST-OP 4X8

## (undated) DEVICE — PREP CHLORAPREP 26ML TINTED HI-LITE ORANGE 930815

## (undated) DEVICE — DRSG XEROFORM 1X8"

## (undated) DEVICE — WIRE GUIDE 3.2X400MM  357.399

## (undated) DEVICE — DRAPE U SPLIT 74X120" 29440

## (undated) DEVICE — PLATE GROUNDING ADULT W/CORD 9165L

## (undated) DEVICE — DRAPE IOBAN INCISE 23X17" 6650EZ

## (undated) DEVICE — IMP SCR SYN 5.0 TI LOCK T25 STARDRIVE 36MM 04.005.526: Type: IMPLANTABLE DEVICE | Site: HIP | Status: NON-FUNCTIONAL

## (undated) DEVICE — SU ETHILON 2-0 FS 18" 664G

## (undated) DEVICE — KIT PATIENT CARE HANA TABLE PROFX SUPINE 6855

## (undated) DEVICE — ALCOHOL ISOPROPYL 4 OZ 70% IA7004

## (undated) DEVICE — GLOVE BIOGEL PI ORTHOPRO SZ 7.5 47675

## (undated) DEVICE — SOL WATER IRRIG 1000ML BOTTLE 2F7114

## (undated) DEVICE — MAT FLOOR SURGICAL 40X38 0702140238

## (undated) DEVICE — CUSTOM PACK GEN MAJOR SBA5BGMHEA

## (undated) DEVICE — SUTURE VICRYL+ 0 27IN CT-1 UND VCP260H

## (undated) DEVICE — DRAPE STERI U 1015

## (undated) DEVICE — ROD SYN REAMER BALL TIP 2.5X950MM  351.706S

## (undated) DEVICE — GLOVE BIOGEL PI ULTRATOUCH G SZ 7.5 42175

## (undated) DEVICE — SUCTION TIP YANKAUER FLEX ORTHO K62

## (undated) DEVICE — Device

## (undated) DEVICE — SUTURE VICRYL+ 2-0 27IN CT-1 UND VCP259H

## (undated) RX ORDER — EPHEDRINE SULFATE 50 MG/ML
INJECTION, SOLUTION INTRAMUSCULAR; INTRAVENOUS; SUBCUTANEOUS
Status: DISPENSED
Start: 2022-03-07

## (undated) RX ORDER — FENTANYL CITRATE 50 UG/ML
INJECTION, SOLUTION INTRAMUSCULAR; INTRAVENOUS
Status: DISPENSED
Start: 2022-03-07